# Patient Record
Sex: FEMALE | Race: WHITE | NOT HISPANIC OR LATINO | Employment: OTHER | URBAN - METROPOLITAN AREA
[De-identification: names, ages, dates, MRNs, and addresses within clinical notes are randomized per-mention and may not be internally consistent; named-entity substitution may affect disease eponyms.]

---

## 2023-03-07 LAB
ALANINE AMINOTRANSFERASE (SGPT) (U/L) IN SER/PLAS: 13 U/L (ref 7–45)
ALBUMIN (G/DL) IN SER/PLAS: 4.2 G/DL (ref 3.4–5)
ALKALINE PHOSPHATASE (U/L) IN SER/PLAS: 46 U/L (ref 33–136)
ASPARTATE AMINOTRANSFERASE (SGOT) (U/L) IN SER/PLAS: 20 U/L (ref 9–39)
BASOPHILS (10*3/UL) IN BLOOD BY AUTOMATED COUNT: 0.01 X10E9/L (ref 0–0.1)
BASOPHILS/100 LEUKOCYTES IN BLOOD BY AUTOMATED COUNT: 0.3 % (ref 0–2)
BILIRUBIN DIRECT (MG/DL) IN SER/PLAS: 0 MG/DL (ref 0–0.3)
BILIRUBIN TOTAL (MG/DL) IN SER/PLAS: 0.3 MG/DL (ref 0–1.2)
C REACTIVE PROTEIN (MG/L) IN SER/PLAS: <0.1 MG/DL
CALCIDIOL (25 OH VITAMIN D3) (NG/ML) IN SER/PLAS: 51 NG/ML
CALCIUM (MG/DL) IN SER/PLAS: 11 MG/DL (ref 8.6–10.3)
ERYTHROCYTE DISTRIBUTION WIDTH (RATIO) BY AUTOMATED COUNT: 17.9 % (ref 11.5–14.5)
ERYTHROCYTE MEAN CORPUSCULAR HEMOGLOBIN CONCENTRATION (G/DL) BY AUTOMATED: 29.9 G/DL (ref 32–36)
ERYTHROCYTE MEAN CORPUSCULAR VOLUME (FL) BY AUTOMATED COUNT: 89 FL (ref 80–100)
ERYTHROCYTES (10*6/UL) IN BLOOD BY AUTOMATED COUNT: 4.4 X10E12/L (ref 4–5.2)
GLUCOSE (MG/DL) IN SER/PLAS: 90 MG/DL (ref 74–99)
HEMATOCRIT (%) IN BLOOD BY AUTOMATED COUNT: 39.1 % (ref 36–46)
HEMOGLOBIN (G/DL) IN BLOOD: 11.7 G/DL (ref 12–16)
IMMATURE GRANULOCYTES/100 LEUKOCYTES IN BLOOD BY AUTOMATED COUNT: 0.3 % (ref 0–0.9)
LEUKOCYTES (10*3/UL) IN BLOOD BY AUTOMATED COUNT: 3.2 X10E9/L (ref 4.4–11.3)
LYMPHOCYTES (10*3/UL) IN BLOOD BY AUTOMATED COUNT: 1.33 X10E9/L (ref 1.2–4.8)
LYMPHOCYTES/100 LEUKOCYTES IN BLOOD BY AUTOMATED COUNT: 41.7 % (ref 13–44)
MONOCYTES (10*3/UL) IN BLOOD BY AUTOMATED COUNT: 0.26 X10E9/L (ref 0.1–1)
MONOCYTES/100 LEUKOCYTES IN BLOOD BY AUTOMATED COUNT: 8.2 % (ref 2–10)
NEUTROPHILS (10*3/UL) IN BLOOD BY AUTOMATED COUNT: 1.58 X10E9/L (ref 1.2–7.7)
NEUTROPHILS/100 LEUKOCYTES IN BLOOD BY AUTOMATED COUNT: 49.5 % (ref 40–80)
PLATELETS (10*3/UL) IN BLOOD AUTOMATED COUNT: 175 X10E9/L (ref 150–450)
PROTEIN TOTAL: 6.9 G/DL (ref 6.4–8.2)
URATE (MG/DL) IN SER/PLAS: 4.1 MG/DL (ref 2.3–6.7)
UREA NITROGEN (MG/DL) IN SER/PLAS: 16 MG/DL (ref 6–23)

## 2023-03-08 LAB
PARATHYRIN INTACT (PG/ML) IN SER/PLAS: 152.4 PG/ML (ref 18.5–88)
POC CALCIUM IONIZED (MMOL/L) IN BLOOD: 1.47 MMOL/L (ref 1.1–1.33)

## 2023-03-10 LAB
ALBUMIN ELP: 3.9 G/DL (ref 3.4–5)
ALPHA 1: 0.3 G/DL (ref 0.2–0.6)
ALPHA 2: 0.8 G/DL (ref 0.4–1.1)
BETA: 0.8 G/DL (ref 0.5–1.2)
GAMMA GLOBULIN: 1.1 G/DL (ref 0.5–1.4)
OSTEOCALCIN: 18 NG/ML (ref 8–36)
PATH REVIEW-SERUM PROTEIN ELECTROPHORESIS: NORMAL
PROTEIN ELECTROPHORESIS INTERPRETATION: NORMAL
PROTEIN TOTAL: 6.9 G/DL (ref 6.4–8.2)

## 2023-04-21 DIAGNOSIS — K59.09 CHRONIC CONSTIPATION: ICD-10-CM

## 2023-04-24 PROBLEM — I25.84 CORONARY ARTERY CALCIFICATION: Status: ACTIVE | Noted: 2023-04-24

## 2023-04-24 PROBLEM — E21.3 HYPERPARATHYROIDISM (MULTI): Status: ACTIVE | Noted: 2023-04-24

## 2023-04-24 PROBLEM — F89 DEVELOPMENTAL DISABILITY: Status: ACTIVE | Noted: 2023-04-24

## 2023-04-24 PROBLEM — I10 HTN (HYPERTENSION): Status: ACTIVE | Noted: 2023-04-24

## 2023-04-24 PROBLEM — K44.9 PARAESOPHAGEAL HIATAL HERNIA: Status: ACTIVE | Noted: 2023-04-24

## 2023-04-24 PROBLEM — M81.0 OSTEOPOROSIS: Status: ACTIVE | Noted: 2023-04-24

## 2023-04-24 PROBLEM — K21.9 GERD (GASTROESOPHAGEAL REFLUX DISEASE): Status: ACTIVE | Noted: 2023-04-24

## 2023-04-24 PROBLEM — G47.00 INSOMNIA: Status: ACTIVE | Noted: 2023-04-24

## 2023-04-24 PROBLEM — G40.909 SEIZURE DISORDER (MULTI): Status: ACTIVE | Noted: 2023-04-24

## 2023-04-24 PROBLEM — I25.10 CORONARY ARTERY CALCIFICATION: Status: ACTIVE | Noted: 2023-04-24

## 2023-04-24 PROBLEM — R60.0 PEDAL EDEMA: Status: ACTIVE | Noted: 2023-04-24

## 2023-04-24 PROBLEM — K59.09 CHRONIC CONSTIPATION: Status: ACTIVE | Noted: 2023-04-24

## 2023-04-24 PROBLEM — G80.9 CEREBRAL PALSY (MULTI): Status: ACTIVE | Noted: 2023-04-24

## 2023-04-24 RX ORDER — TRAZODONE HYDROCHLORIDE 100 MG/1
1 TABLET ORAL NIGHTLY
COMMUNITY
Start: 2016-10-31 | End: 2023-05-18 | Stop reason: SDUPTHER

## 2023-04-24 RX ORDER — FOLIC ACID 1 MG/1
1 TABLET ORAL DAILY
COMMUNITY
Start: 2016-09-13 | End: 2023-05-18 | Stop reason: SDUPTHER

## 2023-04-24 RX ORDER — ROMOSOZUMAB-AQQG 105 MG/1.17ML
1 INJECTION, SOLUTION SUBCUTANEOUS
COMMUNITY
End: 2023-08-21 | Stop reason: ALTCHOICE

## 2023-04-24 RX ORDER — MAGNESIUM HYDROXIDE 2400 MG/10ML
10 SUSPENSION ORAL DAILY PRN
COMMUNITY
Start: 2021-01-30

## 2023-04-24 RX ORDER — ASPIRIN 81 MG/1
81 TABLET ORAL DAILY
COMMUNITY
Start: 2021-04-20

## 2023-04-24 RX ORDER — PHENYTOIN SODIUM 100 MG/1
1 CAPSULE, EXTENDED RELEASE ORAL 2 TIMES DAILY
COMMUNITY
Start: 2016-09-13 | End: 2024-02-23

## 2023-04-24 RX ORDER — HYDROCHLOROTHIAZIDE 12.5 MG/1
1 CAPSULE ORAL DAILY
COMMUNITY
Start: 2021-04-20 | End: 2023-05-18 | Stop reason: SDUPTHER

## 2023-04-24 RX ORDER — CARBAMAZEPINE 200 MG/1
TABLET ORAL
COMMUNITY
Start: 2016-09-13 | End: 2023-10-09

## 2023-04-24 RX ORDER — DOCUSATE SODIUM 100 MG/1
1 CAPSULE, LIQUID FILLED ORAL 2 TIMES DAILY
COMMUNITY
Start: 2019-05-06 | End: 2023-08-21 | Stop reason: SDUPTHER

## 2023-04-24 RX ORDER — POLYETHYLENE GLYCOL 3350 17 G/17G
1 POWDER, FOR SOLUTION ORAL DAILY
COMMUNITY
Start: 2021-12-09 | End: 2023-05-18 | Stop reason: SDUPTHER

## 2023-04-24 RX ORDER — CHOLECALCIFEROL (VITAMIN D3) 50 MCG
2 TABLET ORAL DAILY
COMMUNITY
Start: 2021-04-20 | End: 2024-02-20 | Stop reason: SDUPTHER

## 2023-05-01 RX ORDER — DOCUSATE SODIUM 100 MG/1
100 CAPSULE, LIQUID FILLED ORAL 2 TIMES DAILY
Qty: 60 CAPSULE | Refills: 11 | Status: SHIPPED | OUTPATIENT
Start: 2023-05-01 | End: 2023-05-18 | Stop reason: SDUPTHER

## 2023-05-18 DIAGNOSIS — G47.00 INSOMNIA, UNSPECIFIED TYPE: ICD-10-CM

## 2023-05-18 DIAGNOSIS — R60.0 PEDAL EDEMA: ICD-10-CM

## 2023-05-18 DIAGNOSIS — Z00.00 HEALTHCARE MAINTENANCE: ICD-10-CM

## 2023-05-18 DIAGNOSIS — K59.09 CHRONIC CONSTIPATION: ICD-10-CM

## 2023-05-18 RX ORDER — TRAZODONE HYDROCHLORIDE 100 MG/1
100 TABLET ORAL NIGHTLY
Qty: 90 TABLET | Refills: 1 | Status: SHIPPED | OUTPATIENT
Start: 2023-05-18 | End: 2023-08-21 | Stop reason: SDUPTHER

## 2023-05-18 RX ORDER — DOCUSATE SODIUM 100 MG/1
100 CAPSULE, LIQUID FILLED ORAL 2 TIMES DAILY
Qty: 180 CAPSULE | Refills: 1 | Status: SHIPPED | OUTPATIENT
Start: 2023-05-18 | End: 2023-08-21 | Stop reason: SDUPTHER

## 2023-05-18 RX ORDER — HYDROCHLOROTHIAZIDE 12.5 MG/1
12.5 CAPSULE ORAL EVERY MORNING
Qty: 90 CAPSULE | Refills: 1 | Status: SHIPPED | OUTPATIENT
Start: 2023-05-18 | End: 2023-08-21 | Stop reason: SDUPTHER

## 2023-05-18 RX ORDER — FOLIC ACID 1 MG/1
1 TABLET ORAL DAILY
Qty: 90 TABLET | Refills: 1 | Status: SHIPPED | OUTPATIENT
Start: 2023-05-18 | End: 2023-08-21 | Stop reason: SDUPTHER

## 2023-05-18 RX ORDER — POLYETHYLENE GLYCOL 3350 17 G/17G
17 POWDER, FOR SOLUTION ORAL DAILY
Qty: 1530 G | Refills: 1 | Status: SHIPPED | OUTPATIENT
Start: 2023-05-18 | End: 2023-08-21 | Stop reason: SDUPTHER

## 2023-07-19 LAB
BASOPHILS (10*3/UL) IN BLOOD BY AUTOMATED COUNT: 0.01 X10E9/L (ref 0–0.1)
BASOPHILS/100 LEUKOCYTES IN BLOOD BY AUTOMATED COUNT: 0.3 % (ref 0–2)
ERYTHROCYTE DISTRIBUTION WIDTH (RATIO) BY AUTOMATED COUNT: 17.5 % (ref 11.5–14.5)
ERYTHROCYTE MEAN CORPUSCULAR HEMOGLOBIN CONCENTRATION (G/DL) BY AUTOMATED: 29.6 G/DL (ref 32–36)
ERYTHROCYTE MEAN CORPUSCULAR VOLUME (FL) BY AUTOMATED COUNT: 89 FL (ref 80–100)
ERYTHROCYTES (10*6/UL) IN BLOOD BY AUTOMATED COUNT: 4.37 X10E12/L (ref 4–5.2)
HEMATOCRIT (%) IN BLOOD BY AUTOMATED COUNT: 38.9 % (ref 36–46)
HEMOGLOBIN (G/DL) IN BLOOD: 11.5 G/DL (ref 12–16)
IMMATURE GRANULOCYTES/100 LEUKOCYTES IN BLOOD BY AUTOMATED COUNT: 0 % (ref 0–0.9)
LEUKOCYTES (10*3/UL) IN BLOOD BY AUTOMATED COUNT: 3.7 X10E9/L (ref 4.4–11.3)
LYMPHOCYTES (10*3/UL) IN BLOOD BY AUTOMATED COUNT: 1.34 X10E9/L (ref 0.8–3)
LYMPHOCYTES/100 LEUKOCYTES IN BLOOD BY AUTOMATED COUNT: 36 % (ref 13–44)
MONOCYTES (10*3/UL) IN BLOOD BY AUTOMATED COUNT: 0.27 X10E9/L (ref 0.05–0.8)
MONOCYTES/100 LEUKOCYTES IN BLOOD BY AUTOMATED COUNT: 7.3 % (ref 2–10)
NEUTROPHILS (10*3/UL) IN BLOOD BY AUTOMATED COUNT: 2.1 X10E9/L (ref 1.6–5.5)
NEUTROPHILS/100 LEUKOCYTES IN BLOOD BY AUTOMATED COUNT: 56.4 % (ref 40–80)
PHENYTOIN (UG/ML) IN SER/PLAS: 11 UG/ML (ref 10–20)
PLATELETS (10*3/UL) IN BLOOD AUTOMATED COUNT: 194 X10E9/L (ref 150–450)

## 2023-07-20 LAB — CARBAMAZEPINE (UG/ML) IN SER/PLAS: 6.4 UG/ML (ref 4–12)

## 2023-08-15 PROBLEM — R93.89 ABNORMAL CT SCAN: Status: ACTIVE | Noted: 2023-08-15

## 2023-08-15 PROBLEM — E87.6 HYPOKALEMIA: Status: ACTIVE | Noted: 2023-08-15

## 2023-08-15 PROBLEM — R79.89 ABNORMAL CBC: Status: ACTIVE | Noted: 2023-08-15

## 2023-08-21 ENCOUNTER — OFFICE VISIT (OUTPATIENT)
Dept: PRIMARY CARE | Facility: CLINIC | Age: 73
End: 2023-08-21
Payer: MEDICARE

## 2023-08-21 VITALS
TEMPERATURE: 98.3 F | HEART RATE: 82 BPM | DIASTOLIC BLOOD PRESSURE: 91 MMHG | OXYGEN SATURATION: 94 % | SYSTOLIC BLOOD PRESSURE: 128 MMHG

## 2023-08-21 DIAGNOSIS — Z12.31 BREAST CANCER SCREENING BY MAMMOGRAM: ICD-10-CM

## 2023-08-21 DIAGNOSIS — Z00.00 HEALTHCARE MAINTENANCE: ICD-10-CM

## 2023-08-21 DIAGNOSIS — G47.00 INSOMNIA, UNSPECIFIED TYPE: ICD-10-CM

## 2023-08-21 DIAGNOSIS — Z13.820 SCREENING FOR OSTEOPOROSIS: ICD-10-CM

## 2023-08-21 DIAGNOSIS — I10 HYPERTENSION, UNSPECIFIED TYPE: ICD-10-CM

## 2023-08-21 DIAGNOSIS — Z12.4 SCREENING FOR CERVICAL CANCER: ICD-10-CM

## 2023-08-21 DIAGNOSIS — Z00.00 MEDICARE ANNUAL WELLNESS VISIT, SUBSEQUENT: Primary | ICD-10-CM

## 2023-08-21 DIAGNOSIS — K59.09 CHRONIC CONSTIPATION: ICD-10-CM

## 2023-08-21 DIAGNOSIS — Z78.0 MENOPAUSE: ICD-10-CM

## 2023-08-21 DIAGNOSIS — R60.0 PEDAL EDEMA: ICD-10-CM

## 2023-08-21 DIAGNOSIS — Z12.11 SCREENING FOR COLON CANCER: ICD-10-CM

## 2023-08-21 DIAGNOSIS — Z11.59 NEED FOR HEPATITIS C SCREENING TEST: ICD-10-CM

## 2023-08-21 PROCEDURE — 3080F DIAST BP >= 90 MM HG: CPT | Performed by: FAMILY MEDICINE

## 2023-08-21 PROCEDURE — G0439 PPPS, SUBSEQ VISIT: HCPCS | Performed by: FAMILY MEDICINE

## 2023-08-21 PROCEDURE — 1159F MED LIST DOCD IN RCRD: CPT | Performed by: FAMILY MEDICINE

## 2023-08-21 PROCEDURE — 3074F SYST BP LT 130 MM HG: CPT | Performed by: FAMILY MEDICINE

## 2023-08-21 PROCEDURE — 1036F TOBACCO NON-USER: CPT | Performed by: FAMILY MEDICINE

## 2023-08-21 RX ORDER — HYDROCHLOROTHIAZIDE 12.5 MG/1
12.5 CAPSULE ORAL EVERY MORNING
Qty: 90 CAPSULE | Refills: 3 | Status: SHIPPED | OUTPATIENT
Start: 2023-08-21 | End: 2024-02-20 | Stop reason: SDUPTHER

## 2023-08-21 RX ORDER — TRAZODONE HYDROCHLORIDE 100 MG/1
100 TABLET ORAL NIGHTLY
Qty: 90 TABLET | Refills: 3 | Status: SHIPPED | OUTPATIENT
Start: 2023-08-21 | End: 2024-02-20 | Stop reason: SDUPTHER

## 2023-08-21 RX ORDER — DENOSUMAB 60 MG/ML
60 INJECTION SUBCUTANEOUS
COMMUNITY

## 2023-08-21 RX ORDER — DOCUSATE SODIUM 100 MG/1
100 CAPSULE, LIQUID FILLED ORAL 2 TIMES DAILY
Qty: 180 CAPSULE | Refills: 3 | Status: SHIPPED | OUTPATIENT
Start: 2023-08-21 | End: 2024-02-20 | Stop reason: SDUPTHER

## 2023-08-21 RX ORDER — FOLIC ACID 1 MG/1
1 TABLET ORAL DAILY
Qty: 90 TABLET | Refills: 3 | Status: SHIPPED | OUTPATIENT
Start: 2023-08-21 | End: 2024-02-20 | Stop reason: SDUPTHER

## 2023-08-21 RX ORDER — POLYETHYLENE GLYCOL 3350 17 G/17G
17 POWDER, FOR SOLUTION ORAL DAILY
Qty: 1530 G | Refills: 1 | Status: SHIPPED | OUTPATIENT
Start: 2023-08-21 | End: 2024-02-20 | Stop reason: SDUPTHER

## 2023-08-21 ASSESSMENT — ENCOUNTER SYMPTOMS
WHEEZING: 0
FEVER: 0
COUGH: 0
ABDOMINAL PAIN: 0
DIZZINESS: 0
VOMITING: 0
DIFFICULTY URINATING: 0
OCCASIONAL FEELINGS OF UNSTEADINESS: 0
SHORTNESS OF BREATH: 0
LOSS OF SENSATION IN FEET: 0
DEPRESSION: 0
NUMBNESS: 0
BLOOD IN STOOL: 0
UNEXPECTED WEIGHT CHANGE: 0
LIGHT-HEADEDNESS: 0
DIARRHEA: 0
WEAKNESS: 0
DYSURIA: 0
NAUSEA: 0
CHILLS: 0
CONFUSION: 0
TROUBLE SWALLOWING: 0

## 2023-08-21 ASSESSMENT — ACTIVITIES OF DAILY LIVING (ADL)
GROCERY_SHOPPING: TOTAL CARE
DOING_HOUSEWORK: TOTAL CARE
TAKING_MEDICATION: TOTAL CARE
MANAGING_FINANCES: TOTAL CARE

## 2023-08-21 ASSESSMENT — PATIENT HEALTH QUESTIONNAIRE - PHQ9
2. FEELING DOWN, DEPRESSED OR HOPELESS: NOT AT ALL
1. LITTLE INTEREST OR PLEASURE IN DOING THINGS: NOT AT ALL
SUM OF ALL RESPONSES TO PHQ9 QUESTIONS 1 AND 2: 0

## 2023-08-21 NOTE — PATIENT INSTRUCTIONS
Please let me know if Amanda would like to consider taking a statin (e.g., Lipitor, Crestor) for cholesterol and cardiovascular risk reduction, in which case we would recheck her cholesterol.    Please return for a follow-up appointment in 6 months, earlier if any question or concern.    Recommend Shingles vaccine. Shingrix (recombinant zoster, i.e., shingles, vaccine) is a 2-shot vaccination series given at 0 and 2-6 months, indicated for prevention of herpes zoster (shingles) in patients >=50 years of age. The Advisory Committee on Immunization Practices (ACIP) recommends that Shingrix is used for the routine vaccination of immunocompetent patients >=50 years of age, including those who previously received varicella vaccine or zoster vaccine (live) or who report a previous episode of zoster; and patients with chronic medical conditions (e.g., chronic renal failure, diabetes, rheumatoid arthritis, chronic pulmonary disease). Recombinant zoster vaccine is preferred over zoster vaccine (live) in immunocompetent patients (CDC/ACIP [Tenstrike 2018]). Limitations of use: Not indicated for prevention of primary varicella infection (chickenpox) or for the treatment of zoster or postherpetic neuralgia (PHN) (CDC/ACIP [Tenstrike 2018]).     Please return for your next wellness visit in 12 months, earlier if any question or concern.     For assistance with scheduling referrals or consultations, please call [phone]. For laboratory, radiology, and other tests, please call [phone] ([phone] for pediatrics). Please review prescription inserts and published information for possible adverse effects. Return after testing or consultation to review results and recommendations, if symptoms persist, change, worsen, or return, or if you have any question or concern. For non-emergencies, you may call the office. For emergencies, call 9-1-1 or go to the nearest Emergency Department. Please schedule additional appointment(s) to  address concern(s) not addressed today.    In general, results will not be released or discussed over the telephone. Results of tests done through [facility] are released via  [employer]:  [email or web address]    Until we complete our transition to the new system, additional information can be found at [email or web address] or on your Android or iOS (iPhone, iPad) device using the [employer] gabriele available free of charge in your device's gabriele store.

## 2023-08-21 NOTE — PROGRESS NOTES
Subjective   Patient ID: Amanda Pang is a 73 y.o. female who presents for Establish Care (Pt presents as new to you pt to establish care and pt is non ambulatory, speech impaired.BL).  HPI      Review of Systems   Constitutional:  Negative for chills, fever and unexpected weight change.   HENT:  Negative for ear pain and trouble swallowing.    Respiratory:  Negative for cough, shortness of breath and wheezing.    Cardiovascular:  Negative for chest pain.   Gastrointestinal:  Negative for abdominal pain, blood in stool, diarrhea, nausea and vomiting.   Genitourinary:  Negative for difficulty urinating and dysuria.   Skin:  Negative for rash.   Neurological:  Negative for dizziness, syncope, weakness, light-headedness and numbness.   Psychiatric/Behavioral:  Negative for behavioral problems and confusion.          Objective   BP (!) 128/91   Pulse 82   Temp 36.8 °C (98.3 °F)   SpO2 94%     Physical Exam  Vitals and nursing note reviewed.   Constitutional:       General: She is not in acute distress.     Appearance: Normal appearance. She is well-developed.   HENT:      Head: Normocephalic and atraumatic.      Nose: Nose normal.   Eyes:      General: No scleral icterus.     Extraocular Movements: Extraocular movements intact.      Conjunctiva/sclera: Conjunctivae normal.   Neck:      Thyroid: No thyromegaly.      Vascular: No carotid bruit or JVD.   Cardiovascular:      Rate and Rhythm: Normal rate and regular rhythm.      Heart sounds: Normal heart sounds.   Pulmonary:      Effort: Pulmonary effort is normal. No respiratory distress.      Breath sounds: Normal breath sounds.   Abdominal:      General: Bowel sounds are normal. There is no distension.      Palpations: Abdomen is soft. There is no mass.      Tenderness: There is no abdominal tenderness. There is no guarding or rebound.   Musculoskeletal:      Cervical back: Normal range of motion. No tenderness.      Right lower le+ Pitting Edema present.       Left lower le+ Pitting Edema present.   Skin:     General: Skin is warm and dry.      Coloration: Skin is not jaundiced.   Neurological:      Mental Status: She is alert. Mental status is at baseline.      Motor: Abnormal muscle tone (left sidebending in wheelchair, contractures of LUE) present.      Gait: Gait abnormal (wheelchair-bound).      Comments: dysphonia   Psychiatric:         Mood and Affect: Mood normal.         Assessment/Plan   Problem List Items Addressed This Visit       Chronic constipation     Return 6 months.         Relevant Medications    docusate sodium (Colace) 100 mg capsule    polyethylene glycol (Glycolax, Miralax) 17 gram/dose powder    HTN (hypertension)     Well controlled.          Insomnia    Relevant Medications    traZODone (Desyrel) 100 mg tablet    Pedal edema    Relevant Medications    hydroCHLOROthiazide (Microzide) 12.5 mg capsule    Medicare annual wellness visit, subsequent - Primary     73yF with special needs, doing fairly well.         Screening for colon cancer     Declined.         Need for hepatitis C screening test    Relevant Orders    Hepatitis C Antibody    Breast cancer screening by mammogram     Does not tolerate the imaging.         Screening for cervical cancer     No longer indicated.         Menopause    Screening for osteoporosis     Per rheumatology.          Other Visit Diagnoses       Healthcare maintenance        Relevant Medications    folic acid (Folvite) 1 mg tablet

## 2023-08-29 ENCOUNTER — LAB (OUTPATIENT)
Dept: LAB | Facility: LAB | Age: 73
End: 2023-08-29
Payer: MEDICARE

## 2023-08-29 DIAGNOSIS — Z11.59 NEED FOR HEPATITIS C SCREENING TEST: ICD-10-CM

## 2023-08-29 LAB
ALANINE AMINOTRANSFERASE (SGPT) (U/L) IN SER/PLAS: 13 U/L (ref 7–45)
ALBUMIN (G/DL) IN SER/PLAS: 4.3 G/DL (ref 3.4–5)
ALKALINE PHOSPHATASE (U/L) IN SER/PLAS: 51 U/L (ref 33–136)
ASPARTATE AMINOTRANSFERASE (SGOT) (U/L) IN SER/PLAS: 20 U/L (ref 9–39)
BASOPHILS (10*3/UL) IN BLOOD BY AUTOMATED COUNT: 0.01 X10E9/L (ref 0–0.1)
BASOPHILS/100 LEUKOCYTES IN BLOOD BY AUTOMATED COUNT: 0.3 % (ref 0–2)
BILIRUBIN DIRECT (MG/DL) IN SER/PLAS: 0 MG/DL (ref 0–0.3)
BILIRUBIN TOTAL (MG/DL) IN SER/PLAS: 0.2 MG/DL (ref 0–1.2)
C REACTIVE PROTEIN (MG/L) IN SER/PLAS: <0.1 MG/DL
CALCIUM (MG/DL) IN SER/PLAS: 11.5 MG/DL (ref 8.6–10.3)
ERYTHROCYTE DISTRIBUTION WIDTH (RATIO) BY AUTOMATED COUNT: 17.4 % (ref 11.5–14.5)
ERYTHROCYTE MEAN CORPUSCULAR HEMOGLOBIN CONCENTRATION (G/DL) BY AUTOMATED: 30.2 G/DL (ref 32–36)
ERYTHROCYTE MEAN CORPUSCULAR VOLUME (FL) BY AUTOMATED COUNT: 90 FL (ref 80–100)
ERYTHROCYTES (10*6/UL) IN BLOOD BY AUTOMATED COUNT: 4.37 X10E12/L (ref 4–5.2)
HEMATOCRIT (%) IN BLOOD BY AUTOMATED COUNT: 39.4 % (ref 36–46)
HEMOGLOBIN (G/DL) IN BLOOD: 11.9 G/DL (ref 12–16)
IMMATURE GRANULOCYTES/100 LEUKOCYTES IN BLOOD BY AUTOMATED COUNT: 0.3 % (ref 0–0.9)
LEUKOCYTES (10*3/UL) IN BLOOD BY AUTOMATED COUNT: 3.7 X10E9/L (ref 4.4–11.3)
LYMPHOCYTES (10*3/UL) IN BLOOD BY AUTOMATED COUNT: 1.32 X10E9/L (ref 0.8–3)
LYMPHOCYTES/100 LEUKOCYTES IN BLOOD BY AUTOMATED COUNT: 35.6 % (ref 13–44)
MONOCYTES (10*3/UL) IN BLOOD BY AUTOMATED COUNT: 0.27 X10E9/L (ref 0.05–0.8)
MONOCYTES/100 LEUKOCYTES IN BLOOD BY AUTOMATED COUNT: 7.3 % (ref 2–10)
NEUTROPHILS (10*3/UL) IN BLOOD BY AUTOMATED COUNT: 2.1 X10E9/L (ref 1.6–5.5)
NEUTROPHILS/100 LEUKOCYTES IN BLOOD BY AUTOMATED COUNT: 56.5 % (ref 40–80)
PLATELETS (10*3/UL) IN BLOOD AUTOMATED COUNT: 192 X10E9/L (ref 150–450)
PROTEIN TOTAL: 7.8 G/DL (ref 6.4–8.2)
SEDIMENTATION RATE, ERYTHROCYTE: 34 MM/H (ref 0–30)
UREA NITROGEN (MG/DL) IN SER/PLAS: 18 MG/DL (ref 6–23)

## 2023-08-29 PROCEDURE — 36415 COLL VENOUS BLD VENIPUNCTURE: CPT

## 2023-08-29 PROCEDURE — 86803 HEPATITIS C AB TEST: CPT

## 2023-08-30 LAB
CALCIDIOL (25 OH VITAMIN D3) (NG/ML) IN SER/PLAS: 60 NG/ML
HEPATITIS C VIRUS AB PRESENCE IN SERUM: NONREACTIVE
PARATHYRIN INTACT (PG/ML) IN SER/PLAS: 151.6 PG/ML (ref 18.5–88)

## 2023-09-01 LAB — OSTEOCALCIN: 12 NG/ML (ref 8–36)

## 2023-09-09 LAB
MISCELLANEUOUS TEST RESULT: NORMAL
NAME OF SENDOUT TEST: NORMAL

## 2023-10-09 DIAGNOSIS — R56.9 SEIZURE (MULTI): Primary | ICD-10-CM

## 2023-10-09 RX ORDER — CARBAMAZEPINE 200 MG/1
TABLET ORAL
Qty: 150 TABLET | Refills: 11 | Status: SHIPPED | OUTPATIENT
Start: 2023-10-09

## 2023-10-18 ENCOUNTER — APPOINTMENT (OUTPATIENT)
Dept: INFUSION THERAPY | Facility: CLINIC | Age: 73
End: 2023-10-18
Payer: MEDICARE

## 2023-11-07 PROBLEM — Z74.09 IMPAIRED MOBILITY AND ADLS: Status: ACTIVE | Noted: 2023-11-07

## 2023-11-07 PROBLEM — Z53.20 MAMMOGRAM DECLINED: Status: ACTIVE | Noted: 2023-11-07

## 2023-11-07 PROBLEM — Z78.9 IMPAIRED MOBILITY AND ADLS: Status: ACTIVE | Noted: 2023-11-07

## 2023-11-07 RX ORDER — ROMOSOZUMAB-AQQG 105 MG/1.17ML
INJECTION, SOLUTION SUBCUTANEOUS
COMMUNITY
End: 2024-02-20 | Stop reason: ALTCHOICE

## 2023-11-08 ENCOUNTER — INFUSION (OUTPATIENT)
Dept: HEMATOLOGY/ONCOLOGY | Facility: CLINIC | Age: 73
End: 2023-11-08
Payer: MEDICARE

## 2023-11-08 VITALS
DIASTOLIC BLOOD PRESSURE: 77 MMHG | HEART RATE: 74 BPM | BODY MASS INDEX: 19.9 KG/M2 | TEMPERATURE: 97 F | WEIGHT: 94.8 LBS | HEIGHT: 58 IN | SYSTOLIC BLOOD PRESSURE: 138 MMHG | RESPIRATION RATE: 17 BRPM | OXYGEN SATURATION: 99 %

## 2023-11-08 DIAGNOSIS — M81.0 OSTEOPOROSIS, UNSPECIFIED OSTEOPOROSIS TYPE, UNSPECIFIED PATHOLOGICAL FRACTURE PRESENCE: Primary | ICD-10-CM

## 2023-11-08 PROCEDURE — 96372 THER/PROPH/DIAG INJ SC/IM: CPT

## 2023-11-08 PROCEDURE — 2500000004 HC RX 250 GENERAL PHARMACY W/ HCPCS (ALT 636 FOR OP/ED): Mod: JZ,JG | Performed by: INTERNAL MEDICINE

## 2023-11-08 RX ORDER — FAMOTIDINE 10 MG/ML
20 INJECTION INTRAVENOUS ONCE AS NEEDED
Status: DISCONTINUED | OUTPATIENT
Start: 2023-11-08 | End: 2023-11-08 | Stop reason: HOSPADM

## 2023-11-08 RX ORDER — FAMOTIDINE 10 MG/ML
20 INJECTION INTRAVENOUS ONCE AS NEEDED
OUTPATIENT
Start: 2024-05-06

## 2023-11-08 RX ORDER — DIPHENHYDRAMINE HYDROCHLORIDE 50 MG/ML
50 INJECTION INTRAMUSCULAR; INTRAVENOUS AS NEEDED
Status: DISCONTINUED | OUTPATIENT
Start: 2023-11-08 | End: 2023-11-08 | Stop reason: HOSPADM

## 2023-11-08 RX ORDER — EPINEPHRINE 0.3 MG/.3ML
0.3 INJECTION SUBCUTANEOUS EVERY 5 MIN PRN
Status: DISCONTINUED | OUTPATIENT
Start: 2023-11-08 | End: 2023-11-08 | Stop reason: HOSPADM

## 2023-11-08 RX ORDER — ALBUTEROL SULFATE 0.83 MG/ML
3 SOLUTION RESPIRATORY (INHALATION) AS NEEDED
Status: DISCONTINUED | OUTPATIENT
Start: 2023-11-08 | End: 2023-11-08 | Stop reason: HOSPADM

## 2023-11-08 RX ORDER — ALBUTEROL SULFATE 0.83 MG/ML
3 SOLUTION RESPIRATORY (INHALATION) AS NEEDED
OUTPATIENT
Start: 2024-05-06

## 2023-11-08 RX ORDER — EPINEPHRINE 0.3 MG/.3ML
0.3 INJECTION SUBCUTANEOUS EVERY 5 MIN PRN
OUTPATIENT
Start: 2024-05-06

## 2023-11-08 RX ORDER — DIPHENHYDRAMINE HYDROCHLORIDE 50 MG/ML
50 INJECTION INTRAMUSCULAR; INTRAVENOUS AS NEEDED
OUTPATIENT
Start: 2024-05-06

## 2023-11-08 RX ADMIN — DENOSUMAB 60 MG: 60 INJECTION SUBCUTANEOUS at 13:05

## 2023-11-08 ASSESSMENT — PAIN SCALES - GENERAL: PAINLEVEL: 0-NO PAIN

## 2024-01-09 ENCOUNTER — APPOINTMENT (OUTPATIENT)
Dept: PRIMARY CARE | Facility: CLINIC | Age: 74
End: 2024-01-09
Payer: MEDICARE

## 2024-02-20 ENCOUNTER — OFFICE VISIT (OUTPATIENT)
Dept: PRIMARY CARE | Facility: CLINIC | Age: 74
End: 2024-02-20
Payer: MEDICARE

## 2024-02-20 VITALS
BODY MASS INDEX: 19.81 KG/M2 | OXYGEN SATURATION: 96 % | SYSTOLIC BLOOD PRESSURE: 159 MMHG | WEIGHT: 95 LBS | DIASTOLIC BLOOD PRESSURE: 89 MMHG | HEART RATE: 79 BPM

## 2024-02-20 DIAGNOSIS — K59.09 CHRONIC CONSTIPATION: ICD-10-CM

## 2024-02-20 DIAGNOSIS — M81.0 OSTEOPOROSIS, UNSPECIFIED OSTEOPOROSIS TYPE, UNSPECIFIED PATHOLOGICAL FRACTURE PRESENCE: ICD-10-CM

## 2024-02-20 DIAGNOSIS — G80.9 CEREBRAL PALSY, UNSPECIFIED TYPE (MULTI): Primary | ICD-10-CM

## 2024-02-20 DIAGNOSIS — G47.00 INSOMNIA, UNSPECIFIED TYPE: ICD-10-CM

## 2024-02-20 DIAGNOSIS — Z00.00 HEALTHCARE MAINTENANCE: ICD-10-CM

## 2024-02-20 DIAGNOSIS — R60.0 PEDAL EDEMA: ICD-10-CM

## 2024-02-20 PROCEDURE — 3079F DIAST BP 80-89 MM HG: CPT | Performed by: FAMILY MEDICINE

## 2024-02-20 PROCEDURE — 1126F AMNT PAIN NOTED NONE PRSNT: CPT | Performed by: FAMILY MEDICINE

## 2024-02-20 PROCEDURE — 1036F TOBACCO NON-USER: CPT | Performed by: FAMILY MEDICINE

## 2024-02-20 PROCEDURE — 1159F MED LIST DOCD IN RCRD: CPT | Performed by: FAMILY MEDICINE

## 2024-02-20 PROCEDURE — 3077F SYST BP >= 140 MM HG: CPT | Performed by: FAMILY MEDICINE

## 2024-02-20 PROCEDURE — 99214 OFFICE O/P EST MOD 30 MIN: CPT | Performed by: FAMILY MEDICINE

## 2024-02-20 RX ORDER — FOLIC ACID 1 MG/1
1 TABLET ORAL DAILY
Qty: 90 TABLET | Refills: 3 | Status: SHIPPED | OUTPATIENT
Start: 2024-02-20

## 2024-02-20 RX ORDER — TRAZODONE HYDROCHLORIDE 100 MG/1
100 TABLET ORAL NIGHTLY
Qty: 90 TABLET | Refills: 3 | Status: SHIPPED | OUTPATIENT
Start: 2024-02-20

## 2024-02-20 RX ORDER — HYDROCHLOROTHIAZIDE 12.5 MG/1
12.5 CAPSULE ORAL EVERY MORNING
Qty: 90 CAPSULE | Refills: 3 | Status: SHIPPED | OUTPATIENT
Start: 2024-02-20

## 2024-02-20 RX ORDER — DOCUSATE SODIUM 100 MG/1
100 CAPSULE, LIQUID FILLED ORAL 2 TIMES DAILY
Qty: 60 CAPSULE | Refills: 11 | Status: SHIPPED | OUTPATIENT
Start: 2024-02-20

## 2024-02-20 RX ORDER — POLYETHYLENE GLYCOL 3350 17 G/17G
17 POWDER, FOR SOLUTION ORAL DAILY
Qty: 1530 G | Refills: 1 | Status: SHIPPED | OUTPATIENT
Start: 2024-02-20

## 2024-02-20 RX ORDER — CHOLECALCIFEROL (VITAMIN D3) 50 MCG
4000 TABLET ORAL DAILY
Qty: 60 TABLET | Refills: 11 | Status: SHIPPED | OUTPATIENT
Start: 2024-02-20

## 2024-02-20 ASSESSMENT — ENCOUNTER SYMPTOMS
DIFFICULTY URINATING: 0
DYSURIA: 0
SHORTNESS OF BREATH: 0
NUMBNESS: 0
TROUBLE SWALLOWING: 0
WEAKNESS: 0
COUGH: 0
DIARRHEA: 0
NAUSEA: 0
LIGHT-HEADEDNESS: 0
FEVER: 0
BLOOD IN STOOL: 0
UNEXPECTED WEIGHT CHANGE: 0
VOMITING: 0
ABDOMINAL PAIN: 0
CONFUSION: 0
CHILLS: 0
DIZZINESS: 0
WHEEZING: 0

## 2024-02-20 NOTE — PROGRESS NOTES
Subjective   Patient ID: Amanda Pang is a 73 y.o. female who presents for Follow-up (Pt presents with care taker, 6 month check up, rx's needed.BL).  HPI    Generally feeling well.     Not checking BP at home. Reports BP satisfactorily controlled according to cardiology.        Review of Systems   Constitutional:  Negative for chills, fever and unexpected weight change.   HENT:  Negative for ear pain and trouble swallowing.    Respiratory:  Negative for cough, shortness of breath and wheezing.    Cardiovascular:  Negative for chest pain.   Gastrointestinal:  Negative for abdominal pain, blood in stool, diarrhea, nausea and vomiting.   Genitourinary:  Negative for difficulty urinating and dysuria.   Skin:  Negative for rash.   Neurological:  Negative for dizziness, syncope, weakness, light-headedness and numbness.   Psychiatric/Behavioral:  Negative for behavioral problems and confusion.          Objective   /89   Pulse 79   Wt (!) 43.1 kg (95 lb)   SpO2 96%   BMI 19.81 kg/m²     Physical Exam  Vitals and nursing note reviewed.   Constitutional:       General: She is not in acute distress.     Appearance: She is not diaphoretic.   HENT:      Head: Normocephalic and atraumatic.   Eyes:      General: No scleral icterus.     Extraocular Movements: Extraocular movements intact.      Conjunctiva/sclera: Conjunctivae normal.   Cardiovascular:      Rate and Rhythm: Normal rate and regular rhythm.      Heart sounds: Normal heart sounds.   Pulmonary:      Effort: Pulmonary effort is normal. No respiratory distress.      Breath sounds: Normal breath sounds. No wheezing, rhonchi or rales.   Musculoskeletal:      Right lower le+ Pitting Edema present.      Left lower le+ Pitting Edema present.   Skin:     General: Skin is warm and dry.      Coloration: Skin is not jaundiced.   Neurological:      General: No focal deficit present.      Mental Status: She is alert and oriented to person, place, and time.  Mental status is at baseline.   Psychiatric:         Mood and Affect: Mood normal.         Behavior: Behavior normal.         Thought Content: Thought content normal.         Assessment/Plan   Problem List Items Addressed This Visit       Cerebral palsy (CMS/HCC) - Primary     Add gait belt PRN.         Relevant Orders    Miscellaneous DME    Chronic constipation     Well controlled. Continue current bowel regimen.         Relevant Medications    docusate sodium (Colace) 100 mg capsule    polyethylene glycol (Glycolax, Miralax) 17 gram/dose powder    Insomnia    Relevant Medications    traZODone (Desyrel) 100 mg tablet    Osteoporosis    Relevant Medications    cholecalciferol (Vitamin D-3) 50 MCG (2000 UT) tablet    Pedal edema     Stable.         Relevant Medications    hydroCHLOROthiazide (Microzide) 12.5 mg capsule     Other Visit Diagnoses       Healthcare maintenance        Relevant Medications    folic acid (Folvite) 1 mg tablet

## 2024-02-20 NOTE — PATIENT INSTRUCTIONS
Please return for a(n) follow-up appointment and Wellness visit in 6 months, earlier if any question or concern.    Avoid taking Biotin for a week prior to any blood tests, as it can interfere with certain results. Fasting for labs means 12 hours, nothing to eat or drink, except water and medications, unless directed otherwise.    For assistance with scheduling referrals or consultations, please call [phone]. For laboratory, radiology, and other tests, please call [phone] ([phone] for pediatrics). Please review prescription inserts and published information for possible adverse effects of all medications. Return after testing or consultation to review results and recommendations, if symptoms persist, change, worsen, or return, or if you have any question or concern. If you do not get results within 7-10 days, or you have any question or concern, please send a message, call the office ([phone]), or return to the office for a follow-up appointment. For non-emergencies, you may call the office. For emergencies, call 9-1-1 or go to the nearest Emergency Department. Please schedule additional appointment(s) to address concern(s) not addressed today.    In general, results are not released or discussed over the telephone, but at an appointment or via  [employer]. Results of tests done through [facility] are released via  [employer] (see below).  [email or web address]   [employer] support line: [phone]

## 2024-02-23 DIAGNOSIS — G40.909 SEIZURE DISORDER (MULTI): Primary | ICD-10-CM

## 2024-02-23 RX ORDER — PHENYTOIN SODIUM 100 MG/1
100 CAPSULE, EXTENDED RELEASE ORAL 2 TIMES DAILY
Qty: 60 CAPSULE | Refills: 11 | Status: SHIPPED | OUTPATIENT
Start: 2024-02-23

## 2024-02-27 ENCOUNTER — OFFICE VISIT (OUTPATIENT)
Dept: ENDOCRINOLOGY | Facility: CLINIC | Age: 74
End: 2024-02-27
Payer: MEDICARE

## 2024-02-27 VITALS — HEART RATE: 70 BPM | SYSTOLIC BLOOD PRESSURE: 139 MMHG | DIASTOLIC BLOOD PRESSURE: 96 MMHG

## 2024-02-27 DIAGNOSIS — M81.0 OSTEOPOROSIS WITHOUT CURRENT PATHOLOGICAL FRACTURE, UNSPECIFIED OSTEOPOROSIS TYPE: ICD-10-CM

## 2024-02-27 DIAGNOSIS — E21.3 HYPERPARATHYROIDISM (MULTI): Primary | ICD-10-CM

## 2024-02-27 PROCEDURE — 1160F RVW MEDS BY RX/DR IN RCRD: CPT | Performed by: INTERNAL MEDICINE

## 2024-02-27 PROCEDURE — 3080F DIAST BP >= 90 MM HG: CPT | Performed by: INTERNAL MEDICINE

## 2024-02-27 PROCEDURE — 3075F SYST BP GE 130 - 139MM HG: CPT | Performed by: INTERNAL MEDICINE

## 2024-02-27 PROCEDURE — 1159F MED LIST DOCD IN RCRD: CPT | Performed by: INTERNAL MEDICINE

## 2024-02-27 PROCEDURE — 99213 OFFICE O/P EST LOW 20 MIN: CPT | Performed by: INTERNAL MEDICINE

## 2024-02-27 RX ORDER — ALBUTEROL SULFATE 0.83 MG/ML
3 SOLUTION RESPIRATORY (INHALATION) AS NEEDED
Status: CANCELLED | OUTPATIENT
Start: 2024-05-08

## 2024-02-27 RX ORDER — HEPARIN 100 UNIT/ML
500 SYRINGE INTRAVENOUS AS NEEDED
OUTPATIENT
Start: 2024-05-08

## 2024-02-27 RX ORDER — FAMOTIDINE 10 MG/ML
20 INJECTION INTRAVENOUS ONCE AS NEEDED
Status: CANCELLED | OUTPATIENT
Start: 2024-05-08

## 2024-02-27 RX ORDER — EPINEPHRINE 0.3 MG/.3ML
0.3 INJECTION SUBCUTANEOUS EVERY 5 MIN PRN
Status: CANCELLED | OUTPATIENT
Start: 2024-05-08

## 2024-02-27 RX ORDER — HEPARIN SODIUM,PORCINE/PF 10 UNIT/ML
50 SYRINGE (ML) INTRAVENOUS AS NEEDED
OUTPATIENT
Start: 2024-05-08

## 2024-02-27 RX ORDER — DIPHENHYDRAMINE HYDROCHLORIDE 50 MG/ML
50 INJECTION INTRAMUSCULAR; INTRAVENOUS AS NEEDED
Status: CANCELLED | OUTPATIENT
Start: 2024-05-08

## 2024-02-27 NOTE — PROGRESS NOTES
History Of Present Illness  Amanda Pang is a 73 y.o. female with osteoporosis  Hyperparathyroidism    Prolia 60 mg q6 months    Patient not a candidate for parathyroidectomy    Past Medical History  She has a past medical history of Diaphragmatic hernia without obstruction or gangrene (10/22/2021), Epilepsy, unspecified, not intractable, without status epilepticus (CMS/HCC) (02/26/2019), Impacted cerumen, bilateral (09/18/2019), Personal history of pneumonia (recurrent) (04/20/2021), Syncope and collapse (09/18/2019), and Urinary tract infection, site not specified (09/18/2019).    Surgical History  She has a past surgical history that includes Other surgical history (09/18/2019).     Social History  She reports that she has never smoked. She has never used smokeless tobacco. She reports that she does not drink alcohol and does not use drugs.    Family History  Family History   Problem Relation Name Age of Onset    Breast cancer Mother         Medications  Current Outpatient Medications   Medication Instructions    aspirin 81 mg, oral, Daily    carBAMazepine (TEGretol) 200 mg tablet TAKE 2 TABS (400MG) BY MOUTH EVERY MORNING;TAKE 1 TAB BY MOUTH THREE TIMES DAILY    cholecalciferol (VITAMIN D-3) 4,000 Units, oral, Daily    docusate sodium (COLACE) 100 mg, oral, 2 times daily    folic acid (FOLVITE) 1 mg, oral, Daily    hydroCHLOROthiazide (MICROZIDE) 12.5 mg, oral, Every morning    magnesium hydroxide (Milk of Magnesia) 2,400 mg/10 mL suspension suspension 10 mL, oral, Daily PRN    phenytoin ER (DILANTIN) 100 mg, oral, 2 times daily    polyethylene glycol (GLYCOLAX, MIRALAX) 17 g, oral, Daily    Prolia 60 mg, subcutaneous, Every 6 months    traZODone (DESYREL) 100 mg, oral, Nightly       Allergies  Patient has no known allergies.      Last Recorded Vitals  Blood pressure (!) 139/96, pulse 70.    Physical Exam  Constitutional:       General: She is not in acute distress.         Relevant Results     Latest  Reference Range & Units 03/07/23 10:24   GLUCOSE 74 - 99 mg/dL 90   Blood Urea Nitrogen 6 - 23 mg/dL 16   Calcium 8.6 - 10.3 mg/dL 11.0 (H)   Albumin 3.4 - 5.0 g/dL 4.2   Alkaline Phosphatase 33 - 136 U/L 46   ALT 7 - 45 U/L 13   AST 9 - 39 U/L 20   Bilirubin Total 0.0 - 1.2 mg/dL 0.3   Bilirubin, Direct 0.0 - 0.3 mg/dL 0.0   Total Protein 6.4 - 8.2 g/dL  6.4 - 8.2 g/dL 6.9  6.9      Latest Reference Range & Units 03/07/23 10:24   Protein Electrophoresis Comment  NORMAL   Albumin 3.4 - 5.0 g/dL 3.9   Alpha 1 Globulin 0.2 - 0.6 g/dL 0.3   Alpha 2 Globulin 0.4 - 1.1 g/dL 0.8   Gamma 0.5 - 1.4 g/dL 1.1   Beta Globulin 0.5 - 1.2 g/dL 0.8   Path Review - Serum Protein Electrophoresis   A.SCHMIDT   Calcium, Ion 1.10 - 1.33 mmol/L 1.47 (H)         IMPRESSION  OSTEOPOROSIS    PRIMARY HYPERPARATHYROIDISM      RECOMMENDATIONS  Draw labs in late April  Plan Prolia injection in May    Follow up October if no new intervention.

## 2024-02-27 NOTE — PATIENT INSTRUCTIONS
RECOMMENDATIONS  Draw labs in late April  Plan Prolia injection in May    Follow up October if no new intervention.

## 2024-04-23 ENCOUNTER — LAB (OUTPATIENT)
Dept: LAB | Facility: LAB | Age: 74
End: 2024-04-23
Payer: MEDICARE

## 2024-04-23 DIAGNOSIS — E21.3 HYPERPARATHYROIDISM (MULTI): ICD-10-CM

## 2024-04-23 DIAGNOSIS — M81.0 OSTEOPOROSIS WITHOUT CURRENT PATHOLOGICAL FRACTURE, UNSPECIFIED OSTEOPOROSIS TYPE: ICD-10-CM

## 2024-04-23 LAB
25(OH)D3 SERPL-MCNC: 55 NG/ML (ref 30–100)
ALBUMIN SERPL BCP-MCNC: 4.1 G/DL (ref 3.4–5)
ALP SERPL-CCNC: 47 U/L (ref 33–136)
ALT SERPL W P-5'-P-CCNC: 12 U/L (ref 7–45)
ANION GAP SERPL CALC-SCNC: 11 MMOL/L (ref 10–20)
AST SERPL W P-5'-P-CCNC: 19 U/L (ref 9–39)
BILIRUB SERPL-MCNC: 0.3 MG/DL (ref 0–1.2)
BUN SERPL-MCNC: 23 MG/DL (ref 6–23)
CALCIUM SERPL-MCNC: 10.8 MG/DL (ref 8.6–10.3)
CHLORIDE SERPL-SCNC: 104 MMOL/L (ref 98–107)
CO2 SERPL-SCNC: 30 MMOL/L (ref 21–32)
CREAT SERPL-MCNC: 0.58 MG/DL (ref 0.5–1.05)
EGFRCR SERPLBLD CKD-EPI 2021: >90 ML/MIN/1.73M*2
GLUCOSE SERPL-MCNC: 74 MG/DL (ref 74–99)
POTASSIUM SERPL-SCNC: 4.2 MMOL/L (ref 3.5–5.3)
PROT SERPL-MCNC: 7.2 G/DL (ref 6.4–8.2)
PTH-INTACT SERPL-MCNC: 173 PG/ML (ref 18.5–88)
SODIUM SERPL-SCNC: 141 MMOL/L (ref 136–145)

## 2024-04-23 PROCEDURE — 80053 COMPREHEN METABOLIC PANEL: CPT

## 2024-04-23 PROCEDURE — 36415 COLL VENOUS BLD VENIPUNCTURE: CPT

## 2024-04-23 PROCEDURE — 82306 VITAMIN D 25 HYDROXY: CPT

## 2024-04-23 PROCEDURE — 83970 ASSAY OF PARATHORMONE: CPT

## 2024-05-07 ENCOUNTER — APPOINTMENT (OUTPATIENT)
Dept: HEMATOLOGY/ONCOLOGY | Facility: CLINIC | Age: 74
End: 2024-05-07
Payer: MEDICARE

## 2024-05-08 ENCOUNTER — INFUSION (OUTPATIENT)
Dept: HEMATOLOGY/ONCOLOGY | Facility: CLINIC | Age: 74
End: 2024-05-08
Payer: MEDICARE

## 2024-05-08 VITALS
RESPIRATION RATE: 16 BRPM | SYSTOLIC BLOOD PRESSURE: 137 MMHG | BODY MASS INDEX: 19.81 KG/M2 | DIASTOLIC BLOOD PRESSURE: 74 MMHG | OXYGEN SATURATION: 99 % | WEIGHT: 95 LBS | TEMPERATURE: 97 F | HEART RATE: 83 BPM

## 2024-05-08 DIAGNOSIS — M81.0 OSTEOPOROSIS WITHOUT CURRENT PATHOLOGICAL FRACTURE, UNSPECIFIED OSTEOPOROSIS TYPE: ICD-10-CM

## 2024-05-08 PROCEDURE — 96372 THER/PROPH/DIAG INJ SC/IM: CPT

## 2024-05-08 PROCEDURE — 2500000004 HC RX 250 GENERAL PHARMACY W/ HCPCS (ALT 636 FOR OP/ED): Mod: JZ,JG,SE

## 2024-05-08 RX ORDER — ALBUTEROL SULFATE 0.83 MG/ML
3 SOLUTION RESPIRATORY (INHALATION) AS NEEDED
OUTPATIENT
Start: 2024-11-04

## 2024-05-08 RX ORDER — FAMOTIDINE 10 MG/ML
20 INJECTION INTRAVENOUS ONCE AS NEEDED
OUTPATIENT
Start: 2024-11-04

## 2024-05-08 RX ORDER — DIPHENHYDRAMINE HYDROCHLORIDE 50 MG/ML
50 INJECTION INTRAMUSCULAR; INTRAVENOUS AS NEEDED
OUTPATIENT
Start: 2024-11-04

## 2024-05-08 RX ORDER — EPINEPHRINE 0.3 MG/.3ML
0.3 INJECTION SUBCUTANEOUS EVERY 5 MIN PRN
OUTPATIENT
Start: 2024-11-04

## 2024-05-08 RX ADMIN — DENOSUMAB 60 MG: 60 INJECTION SUBCUTANEOUS at 11:59

## 2024-05-08 ASSESSMENT — PAIN SCALES - GENERAL: PAINLEVEL: 0-NO PAIN

## 2024-05-08 NOTE — PROGRESS NOTES
Patient mentally disabled, w/c bound, came with her caregiver Isis, Patient does not have any teeth, no concern for dental education,  patient tolerated Prolia injection well, left via w/c in stable condition.

## 2024-07-03 ENCOUNTER — APPOINTMENT (OUTPATIENT)
Dept: NEUROLOGY | Facility: CLINIC | Age: 74
End: 2024-07-03
Payer: MEDICARE

## 2024-07-15 ENCOUNTER — APPOINTMENT (OUTPATIENT)
Dept: NEUROLOGY | Facility: CLINIC | Age: 74
End: 2024-07-15
Payer: MEDICARE

## 2024-07-15 DIAGNOSIS — R56.9 SEIZURE (MULTI): ICD-10-CM

## 2024-07-15 DIAGNOSIS — G40.909 SEIZURE DISORDER (MULTI): ICD-10-CM

## 2024-07-15 PROCEDURE — 1036F TOBACCO NON-USER: CPT | Performed by: PSYCHIATRY & NEUROLOGY

## 2024-07-15 PROCEDURE — 1159F MED LIST DOCD IN RCRD: CPT | Performed by: PSYCHIATRY & NEUROLOGY

## 2024-07-15 PROCEDURE — 99213 OFFICE O/P EST LOW 20 MIN: CPT | Performed by: PSYCHIATRY & NEUROLOGY

## 2024-07-15 PROCEDURE — 1160F RVW MEDS BY RX/DR IN RCRD: CPT | Performed by: PSYCHIATRY & NEUROLOGY

## 2024-07-15 RX ORDER — CARBAMAZEPINE 200 MG/1
TABLET ORAL
Qty: 150 TABLET | Refills: 11 | Status: SHIPPED | OUTPATIENT
Start: 2024-07-15

## 2024-07-15 RX ORDER — PHENYTOIN SODIUM 100 MG/1
100 CAPSULE, EXTENDED RELEASE ORAL 2 TIMES DAILY
Qty: 60 CAPSULE | Refills: 11 | Status: SHIPPED | OUTPATIENT
Start: 2024-07-15

## 2024-07-15 NOTE — PATIENT INSTRUCTIONS
I am retiring in September.  Please establish care with another  neurologist (call [phone] to schedule an appointment).  Local neurologists I recommend are Dr. Clement Medrano and Dr. Stef Plaza.  Their offices are in [place] or [place] and you can schedule an appointment with them right now at your convenience.  Also, I can recommend Dr. Antonio Gamboa and Dr. Pablo Del Castillo, who will be opening an office in [place] in the coming months - scheduling for them will be available soon by calling the same  scheduling number [phone].   And there are  neurologists in other locations too.  All of your medical records will be in the computer and available to any  physician you choose.  Thanks and best wishes to you  --Dr. Morales

## 2024-07-15 NOTE — PROGRESS NOTES
Chief complaint:    74 year old woman with seizure disorder.  PCP Dr. Potter.  With aidmarika Marinelli.    HPI:    No seizures.  No side effects.  Takes meds regularly.  No new neurological symptoms.       Current medications include:  Phenytoin  mg takes 1-1  Carbamazepine 200 mg takes 2-1-1-1    I reviewed the relevant portions of the patient's chart since the last visit with me on  7/5/23.    Neurologic Exam     Mental Status   Level of consciousness: alert  Chronic static encephalopathy, at baseline.  Largely nonverbal.     Cranial Nerves   Cranial nerves II through XII intact.     Motor Exam   Muscle bulk: normal  Overall muscle tone: normal    Strength   Strength 5/5 except as noted.     Sensory Exam   Light touch normal.     Gait, Coordination, and Reflexes     Coordination   Romberg: negative    Reflexes   Reflexes 2+ except as noted.   Right plantar: normal  Left plantar: normal  Wheelchair bound.      I reviewed the following data on the patient:  Lab work reviewed.    Assessment and Plan:  Stable.  Seizure-free.  Continue current treatment.   The patient was advised I will be retiring in September of 2024.   Suggested patient establish care with another  neurologist.  There are providers that see patients in [facility], and other area locations.  Contact information to schedule was provided to the patient.    PL56air/TC>50%      Kenneth Morales MD

## 2024-08-23 PROBLEM — S91.319A LACERATION OF FOOT: Status: RESOLVED | Noted: 2024-08-23 | Resolved: 2024-08-23

## 2024-08-27 ENCOUNTER — APPOINTMENT (OUTPATIENT)
Dept: PRIMARY CARE | Facility: CLINIC | Age: 74
End: 2024-08-27
Payer: MEDICARE

## 2024-08-27 VITALS
DIASTOLIC BLOOD PRESSURE: 85 MMHG | TEMPERATURE: 98.2 F | HEART RATE: 78 BPM | SYSTOLIC BLOOD PRESSURE: 151 MMHG | OXYGEN SATURATION: 94 %

## 2024-08-27 DIAGNOSIS — Z00.00 HEALTHCARE MAINTENANCE: ICD-10-CM

## 2024-08-27 DIAGNOSIS — K44.9 PARAESOPHAGEAL HIATAL HERNIA: ICD-10-CM

## 2024-08-27 DIAGNOSIS — R60.0 PEDAL EDEMA: ICD-10-CM

## 2024-08-27 DIAGNOSIS — K59.09 CHRONIC CONSTIPATION: ICD-10-CM

## 2024-08-27 DIAGNOSIS — M81.0 OSTEOPOROSIS, UNSPECIFIED OSTEOPOROSIS TYPE, UNSPECIFIED PATHOLOGICAL FRACTURE PRESENCE: ICD-10-CM

## 2024-08-27 DIAGNOSIS — G47.00 INSOMNIA, UNSPECIFIED TYPE: ICD-10-CM

## 2024-08-27 DIAGNOSIS — Z00.00 MEDICARE ANNUAL WELLNESS VISIT, SUBSEQUENT: Primary | ICD-10-CM

## 2024-08-27 PROCEDURE — 3079F DIAST BP 80-89 MM HG: CPT | Performed by: FAMILY MEDICINE

## 2024-08-27 PROCEDURE — 1160F RVW MEDS BY RX/DR IN RCRD: CPT | Performed by: FAMILY MEDICINE

## 2024-08-27 PROCEDURE — 1123F ACP DISCUSS/DSCN MKR DOCD: CPT | Performed by: FAMILY MEDICINE

## 2024-08-27 PROCEDURE — 1159F MED LIST DOCD IN RCRD: CPT | Performed by: FAMILY MEDICINE

## 2024-08-27 PROCEDURE — 1036F TOBACCO NON-USER: CPT | Performed by: FAMILY MEDICINE

## 2024-08-27 PROCEDURE — 1158F ADVNC CARE PLAN TLK DOCD: CPT | Performed by: FAMILY MEDICINE

## 2024-08-27 PROCEDURE — G0439 PPPS, SUBSEQ VISIT: HCPCS | Performed by: FAMILY MEDICINE

## 2024-08-27 PROCEDURE — 1170F FXNL STATUS ASSESSED: CPT | Performed by: FAMILY MEDICINE

## 2024-08-27 PROCEDURE — 3077F SYST BP >= 140 MM HG: CPT | Performed by: FAMILY MEDICINE

## 2024-08-27 PROCEDURE — 99214 OFFICE O/P EST MOD 30 MIN: CPT | Performed by: FAMILY MEDICINE

## 2024-08-27 RX ORDER — DOCUSATE SODIUM 100 MG/1
100 CAPSULE, LIQUID FILLED ORAL 2 TIMES DAILY
Qty: 200 CAPSULE | Refills: 3 | Status: SHIPPED | OUTPATIENT
Start: 2024-08-27

## 2024-08-27 RX ORDER — FOLIC ACID 1 MG/1
1 TABLET ORAL DAILY
Qty: 100 TABLET | Refills: 3 | Status: SHIPPED | OUTPATIENT
Start: 2024-08-27

## 2024-08-27 RX ORDER — HYDROCHLOROTHIAZIDE 12.5 MG/1
12.5 CAPSULE ORAL EVERY MORNING
Qty: 100 CAPSULE | Refills: 3 | Status: SHIPPED | OUTPATIENT
Start: 2024-08-27

## 2024-08-27 RX ORDER — POLYETHYLENE GLYCOL 3350 17 G/17G
17 POWDER, FOR SOLUTION ORAL DAILY
Qty: 1530 G | Refills: 3 | Status: SHIPPED | OUTPATIENT
Start: 2024-08-27

## 2024-08-27 RX ORDER — CHOLECALCIFEROL (VITAMIN D3) 50 MCG
4000 TABLET ORAL DAILY
Qty: 60 TABLET | Refills: 11 | Status: SHIPPED | OUTPATIENT
Start: 2024-08-27

## 2024-08-27 RX ORDER — TRAZODONE HYDROCHLORIDE 100 MG/1
100 TABLET ORAL NIGHTLY
Qty: 100 TABLET | Refills: 3 | Status: SHIPPED | OUTPATIENT
Start: 2024-08-27

## 2024-08-27 ASSESSMENT — ACTIVITIES OF DAILY LIVING (ADL)
DOING_HOUSEWORK: TOTAL CARE
MANAGING_FINANCES: TOTAL CARE
GROCERY_SHOPPING: TOTAL CARE
BATHING: DEPENDENT
DRESSING: DEPENDENT
TAKING_MEDICATION: TOTAL CARE

## 2024-08-27 ASSESSMENT — ENCOUNTER SYMPTOMS
OCCASIONAL FEELINGS OF UNSTEADINESS: 1
DEPRESSION: 0
NECK PAIN: 0
FEVER: 0
BLOOD IN STOOL: 0
ABDOMINAL DISTENTION: 0
CONSTIPATION: 0
FLANK PAIN: 0
NAUSEA: 0
ABDOMINAL PAIN: 0
DIAPHORESIS: 0
VOMITING: 0
LOSS OF SENSATION IN FEET: 1
CHILLS: 0
BACK PAIN: 0
DIARRHEA: 0

## 2024-08-27 ASSESSMENT — PATIENT HEALTH QUESTIONNAIRE - PHQ9
1. LITTLE INTEREST OR PLEASURE IN DOING THINGS: NOT AT ALL
SUM OF ALL RESPONSES TO PHQ9 QUESTIONS 1 AND 2: 0
2. FEELING DOWN, DEPRESSED OR HOPELESS: NOT AT ALL

## 2024-08-27 NOTE — ASSESSMENT & PLAN NOTE
Per endocrinology.  Orders:    cholecalciferol (Vitamin D-3) 50 MCG (2000 UT) tablet; Take 2 tablets (4,000 Units) by mouth once daily.

## 2024-08-27 NOTE — ASSESSMENT & PLAN NOTE
Doing well.  Orders:    traZODone (Desyrel) 100 mg tablet; Take 1 tablet (100 mg) by mouth once daily at bedtime.

## 2024-08-27 NOTE — PROGRESS NOTES
"Subjective   Reason for Visit: Amanda Pang is an 74 y.o. female developmental disability, confined to wheelchair, constipation, here for Medicare Annual Wellness Visit Subsequent (Pt presents for MCV with care taker, discuss hiatal hernia and BM's, rx's needed. bl)     Past Medical, Surgical, and Family History reviewed and updated in chart.    Reviewed all medications by prescribing practitioner or clinical pharmacist (such as prescriptions, OTCs, herbal therapies and supplements) and documented in the medical record.    HPI  Historian(s): Self and Facility Staff    Generally feeling well.     Declines mammogram, colon cancer screening.    Reports DEXA per endocrinology.    Report decreased oral intake.    Has been doing better with bowel movements taking Colace and Miralax. Recently stools have been smears and small amount, does not appear to be aware of urge to defecate, \"just coming out.\" Decreased appetite recently, normally had pretty good appetite. But today had a large soft bowel movement. Had been a bit lethargic, now back to baseline. Reports multiple surgeons have advised she is not a surgical candidate for repair of hiatal hernia.    Reports hasn't been weighed in years, caregiver doesn't feel she has had any significant weight loss.    Patient Care Team:  Alton Potter DO as PCP - General (Family Medicine)  Alton Potter DO as PCP - [facility]ributed Provider  Kenneth Morales MD (Neurology)  Hakeem Vázquez MD as Primary Care Provider  Negrito Yates MD as Surgeon (Surgical Oncology)  Tuan Reyes MD as Consulting Physician (Endocrinology)  Hakeem Vázquez MD as Referring Physician (Rheumatology)  Demetrio Zimmer DPM as Referring Physician (Podiatry)  Yo Daniels MD as Consulting Physician (Cardiology)  Dereje Hernandez OD (Optometry)     Review of Systems   Constitutional:  Negative for chills, diaphoresis and fever.   Gastrointestinal:  Negative for abdominal " distention, abdominal pain, blood in stool, constipation, diarrhea, nausea and vomiting.   Genitourinary:  Negative for flank pain.   Musculoskeletal:  Negative for back pain and neck pain.       Objective   Vitals:  /85   Pulse 78   Temp 36.8 °C (98.2 °F)   SpO2 94%             Physical Exam  Vitals and nursing note reviewed.   Constitutional:       General: She is not in acute distress.     Appearance: She is not ill-appearing, toxic-appearing or diaphoretic.      Comments: Wheelchair.   HENT:      Head: Normocephalic and atraumatic.   Eyes:      General: No scleral icterus.     Extraocular Movements: Extraocular movements intact.      Conjunctiva/sclera: Conjunctivae normal.   Cardiovascular:      Rate and Rhythm: Normal rate and regular rhythm.      Heart sounds: Normal heart sounds.   Pulmonary:      Effort: Pulmonary effort is normal. No respiratory distress.      Breath sounds: Normal breath sounds. No wheezing, rhonchi or rales.   Abdominal:      General: Bowel sounds are normal. There is no distension.      Palpations: Abdomen is soft. There is no mass.      Tenderness: There is no abdominal tenderness. There is no guarding or rebound.   Musculoskeletal:      Cervical back: Decreased range of motion.      Right lower le+ Pitting Edema present.      Left lower le+ Pitting Edema present.   Skin:     General: Skin is warm and dry.      Coloration: Skin is not jaundiced.   Neurological:      General: No focal deficit present.      Mental Status: She is alert. Mental status is at baseline.      Cranial Nerves: Dysarthria present.      Motor: Weakness and atrophy present.   Psychiatric:         Mood and Affect: Mood normal.         Behavior: Behavior normal.         Thought Content: Thought content normal.         Assessment & Plan  Medicare annual wellness visit, subsequent  74yF confined to wheechair, recent episode of decreased stool output, now doing fairly well.       Healthcare  maintenance    Orders:    folic acid (Folvite) 1 mg tablet; Take 1 tablet (1 mg) by mouth once daily.    Chronic constipation  Continue Docusate, Miralax.  Orders:    docusate sodium (Colace) 100 mg capsule; Take 1 capsule (100 mg) by mouth 2 times a day.    polyethylene glycol (Glycolax, Miralax) 17 gram/dose powder; Take 17 g by mouth once daily.    Insomnia, unspecified type  Doing well.  Orders:    traZODone (Desyrel) 100 mg tablet; Take 1 tablet (100 mg) by mouth once daily at bedtime.    Pedal edema  Stable.  Orders:    hydroCHLOROthiazide (Microzide) 12.5 mg capsule; Take 1 capsule (12.5 mg) by mouth once daily in the morning.    Osteoporosis, unspecified osteoporosis type, unspecified pathological fracture presence  Per endocrinology.  Orders:    cholecalciferol (Vitamin D-3) 50 MCG (2000 UT) tablet; Take 2 tablets (4,000 Units) by mouth once daily.    Paraesophageal hiatal hernia  Presently appears to be doing well. Return PRN. Consider follow-up with surgeon.

## 2024-08-27 NOTE — ASSESSMENT & PLAN NOTE
Stable.  Orders:    hydroCHLOROthiazide (Microzide) 12.5 mg capsule; Take 1 capsule (12.5 mg) by mouth once daily in the morning.

## 2024-08-27 NOTE — ASSESSMENT & PLAN NOTE
Continue Docusate, Miralax.  Orders:    docusate sodium (Colace) 100 mg capsule; Take 1 capsule (100 mg) by mouth 2 times a day.    polyethylene glycol (Glycolax, Miralax) 17 gram/dose powder; Take 17 g by mouth once daily.

## 2024-08-27 NOTE — PATIENT INSTRUCTIONS
Please seek immediate medical attention if symptoms return, or severe pain, inability to pass gas or have a bowel movement, unable to hold down fluids or food, lethargy or other significant change in consciousness or behavior. Consider follow-up with a surgeon regarding recent bowel symptoms (possibility it was a bowel obstruction that has spontaneously resolved, and there would then be a risk of recurrence).    General Surgery  Dr. Juan Carlos Lai, Adelaide Contreras PA-C, Dr. Chapo Onofre, Dr. Dara Torres, Richelle Nuno CNP, Dr. Eric Chua ([place]) [phone]  Dr. Kendal Isaac ([place]) [phone]  Dr. Negrito Yates, Dr. Sonido Wiseman, Dr. Jovita Moralez, [facility] ([place]) [phone]     Please return for a(n) blood pressure, constipation, insomnia, and medication follow-up appointment in 6 months, earlier if any question or concern. Please return for your next Wellness visit in 12 months. Please schedule additional problem-focused appointment(s) to address additional problem(s).    Recommended vaccines:  Influenza, annual  Respiratory Syncytial Virus (RSV)  Shingrix (shingles) vaccine series  Avoid taking Biotin (a vitamin, shows up particularly in hair/nail supplements) for a week prior to any blood tests, as it can interfere with certain results. Fasting for labs means 12 hours, nothing to eat or drink, except water and medications, unless directed otherwise.    For assistance with scheduling referrals or consultations, please call [phone]. For laboratory, radiology, and other tests, please call [phone] ([phone] for pediatrics). Please review prescription inserts and published information for possible adverse effects of all medications. Return after testing or consultation to review results and recommendations, if symptoms persist, change, worsen, or return, or if you have any question or concern. If you do not get results within 7-10 days, or you have any question or concern, please send  a message, call the office ([phone]), or return to the office for a follow-up appointment. For non-emergencies, you may call the office. For emergencies, call 9-1-1 or go to the nearest Emergency Department. Please schedule additional appointment(s) to address concern(s) not addressed today.    In general, results are not released or discussed over the telephone, but at an appointment or via  [employer]. Results of tests done through [facility] are released via  [employer] (see below).  [email or web address]   [employer] support line: [phone]

## 2024-10-15 ENCOUNTER — APPOINTMENT (OUTPATIENT)
Dept: ENDOCRINOLOGY | Facility: CLINIC | Age: 74
End: 2024-10-15
Payer: MEDICARE

## 2024-10-15 VITALS
HEART RATE: 76 BPM | WEIGHT: 95 LBS | BODY MASS INDEX: 19.81 KG/M2 | DIASTOLIC BLOOD PRESSURE: 84 MMHG | SYSTOLIC BLOOD PRESSURE: 148 MMHG

## 2024-10-15 DIAGNOSIS — E21.3 HYPERPARATHYROIDISM (MULTI): Primary | ICD-10-CM

## 2024-10-15 DIAGNOSIS — M81.0 OSTEOPOROSIS WITHOUT CURRENT PATHOLOGICAL FRACTURE, UNSPECIFIED OSTEOPOROSIS TYPE: ICD-10-CM

## 2024-10-15 PROCEDURE — 1160F RVW MEDS BY RX/DR IN RCRD: CPT | Performed by: INTERNAL MEDICINE

## 2024-10-15 PROCEDURE — 99213 OFFICE O/P EST LOW 20 MIN: CPT | Performed by: INTERNAL MEDICINE

## 2024-10-15 PROCEDURE — 1159F MED LIST DOCD IN RCRD: CPT | Performed by: INTERNAL MEDICINE

## 2024-10-15 PROCEDURE — 1036F TOBACCO NON-USER: CPT | Performed by: INTERNAL MEDICINE

## 2024-10-15 PROCEDURE — 3077F SYST BP >= 140 MM HG: CPT | Performed by: INTERNAL MEDICINE

## 2024-10-15 PROCEDURE — 3079F DIAST BP 80-89 MM HG: CPT | Performed by: INTERNAL MEDICINE

## 2024-10-15 ASSESSMENT — ENCOUNTER SYMPTOMS
ABDOMINAL PAIN: 0
FEVER: 0
TROUBLE SWALLOWING: 0
DIARRHEA: 0
FATIGUE: 0
NECK PAIN: 0
VOMITING: 0
SHORTNESS OF BREATH: 0
CONSTIPATION: 0
NAUSEA: 0
PALPITATIONS: 0
HEADACHES: 0
NERVOUS/ANXIOUS: 0
WEAKNESS: 0
TREMORS: 0
UNEXPECTED WEIGHT CHANGE: 0

## 2024-10-15 NOTE — PATIENT INSTRUCTIONS
RECOMMENDATIONS  Continue Prolia injection every 6 months.  Scheduled 11/13/24.  Draw blood around 11/4/24    Follow up 6 months.

## 2024-10-15 NOTE — PROGRESS NOTES
History Of Present Illness  Amanda Pang is a 74 y.o. female with osteoporosis  Hyperparathyroidism     Prolia 60 mg q6 months     Patient not a candidate for parathyroidectomy    No interval history of fracture    Past Medical History  She has a past medical history of Diaphragmatic hernia without obstruction or gangrene (10/22/2021), Epilepsy, unspecified, not intractable, without status epilepticus (02/26/2019), Impacted cerumen, bilateral (09/18/2019), Laceration of foot (08/23/2024), Personal history of pneumonia (recurrent) (04/20/2021), Syncope and collapse (09/18/2019), and Urinary tract infection, site not specified (09/18/2019).    Surgical History  She has a past surgical history that includes Other surgical history (09/18/2019).     Social History  She reports that she has never smoked. She has never used smokeless tobacco. She reports that she does not drink alcohol and does not use drugs.    Family History  Family History   Problem Relation Name Age of Onset    Breast cancer Mother         Medications  Current Outpatient Medications   Medication Instructions    aspirin 81 mg, oral, Daily    carBAMazepine (TEGretol) 200 mg tablet TAKE 2 TABS (400MG) BY MOUTH EVERY MORNING;TAKE 1 TAB BY MOUTH THREE TIMES DAILY    cholecalciferol (VITAMIN D-3) 4,000 Units, oral, Daily    docusate sodium (COLACE) 100 mg, oral, 2 times daily    folic acid (FOLVITE) 1 mg, oral, Daily    hydroCHLOROthiazide (MICROZIDE) 12.5 mg, oral, Every morning    magnesium hydroxide (Milk of Magnesia) 2,400 mg/10 mL suspension suspension 10 mL, oral, Daily PRN    phenytoin ER (DILANTIN) 100 mg, oral, 2 times daily    polyethylene glycol (GLYCOLAX, MIRALAX) 17 g, oral, Daily    Prolia 60 mg, subcutaneous, Every 6 months    traZODone (DESYREL) 100 mg, oral, Nightly       Allergies  Patient has no known allergies.    Review of Systems   Constitutional:  Negative for fatigue, fever and unexpected weight change.   HENT:  Negative for  trouble swallowing.    Eyes:  Negative for visual disturbance.   Respiratory:  Negative for shortness of breath.    Cardiovascular:  Negative for chest pain and palpitations.   Gastrointestinal:  Negative for abdominal pain, constipation, diarrhea, nausea and vomiting.   Endocrine: Negative for cold intolerance and heat intolerance.   Musculoskeletal:  Negative for neck pain.   Skin:  Negative for rash.   Neurological:  Negative for tremors, weakness and headaches.   Psychiatric/Behavioral:  The patient is not nervous/anxious.          Last Recorded Vitals  Blood pressure 148/84, pulse 76, weight (!) 43.1 kg (95 lb).    Physical Exam  Constitutional:       General: She is not in acute distress.     Comments: Examined in wheelchair   Cardiovascular:      Comments: Ankle edema         Relevant Results   Latest Reference Range & Units 04/23/24 11:13   GLUCOSE 74 - 99 mg/dL 74   SODIUM 136 - 145 mmol/L 141   POTASSIUM 3.5 - 5.3 mmol/L 4.2   CHLORIDE 98 - 107 mmol/L 104   Bicarbonate 21 - 32 mmol/L 30   Anion Gap 10 - 20 mmol/L 11   Blood Urea Nitrogen 6 - 23 mg/dL 23   Creatinine 0.50 - 1.05 mg/dL 0.58   EGFR >60 mL/min/1.73m*2 >90   Calcium 8.6 - 10.3 mg/dL 10.8 (H)   Albumin 3.4 - 5.0 g/dL 4.1   Alkaline Phosphatase 33 - 136 U/L 47   ALT 7 - 45 U/L 12   AST 9 - 39 U/L 19   Bilirubin Total 0.0 - 1.2 mg/dL 0.3      Latest Reference Range & Units 04/23/24 11:13   Parathyroid Hormone, Intact 18.5 - 88.0 pg/mL 173.0 (H)       IMPRESSION  OSTEOPOROSIS  No interval fracture    PRIMARY HYPERPARATHYROIDISM      RECOMMENDATIONS  Continue Prolia injection every 6 months.  Scheduled 11/13/24.  Draw blood around 11/4/24    Follow up 6 months.   Draw blood before next injection

## 2024-10-15 NOTE — LETTER
October 15, 2024     Alton Potter DO  [address]  [address]  [facility] [address]    Patient: Amanda Pang   YOB: 1950   Date of Visit: 10/15/2024       Dear Dr. Alotn Potter DO:    Thank you for referring Amanda Pang to me for evaluation. Below are my notes for this consultation.  If you have questions, please do not hesitate to call me. I look forward to following your patient along with you.       Sincerely,     Tuan Reyes MD      CC: No Recipients  ______________________________________________________________________________________    History Of Present Illness  Amanda Pang is a 74 y.o. female with osteoporosis  Hyperparathyroidism     Prolia 60 mg q6 months     Patient not a candidate for parathyroidectomy    No interval history of fracture    Past Medical History  She has a past medical history of Diaphragmatic hernia without obstruction or gangrene (10/22/2021), Epilepsy, unspecified, not intractable, without status epilepticus (02/26/2019), Impacted cerumen, bilateral (09/18/2019), Laceration of foot (08/23/2024), Personal history of pneumonia (recurrent) (04/20/2021), Syncope and collapse (09/18/2019), and Urinary tract infection, site not specified (09/18/2019).    Surgical History  She has a past surgical history that includes Other surgical history (09/18/2019).     Social History  She reports that she has never smoked. She has never used smokeless tobacco. She reports that she does not drink alcohol and does not use drugs.    Family History  Family History   Problem Relation Name Age of Onset   • Breast cancer Mother         Medications  Current Outpatient Medications   Medication Instructions   • aspirin 81 mg, oral, Daily   • carBAMazepine (TEGretol) 200 mg tablet TAKE 2 TABS (400MG) BY MOUTH EVERY MORNING;TAKE 1 TAB BY MOUTH THREE TIMES DAILY   • cholecalciferol (VITAMIN D-3) 4,000 Units, oral, Daily   • docusate sodium (COLACE) 100 mg, oral, 2 times daily    • folic acid (FOLVITE) 1 mg, oral, Daily   • hydroCHLOROthiazide (MICROZIDE) 12.5 mg, oral, Every morning   • magnesium hydroxide (Milk of Magnesia) 2,400 mg/10 mL suspension suspension 10 mL, oral, Daily PRN   • phenytoin ER (DILANTIN) 100 mg, oral, 2 times daily   • polyethylene glycol (GLYCOLAX, MIRALAX) 17 g, oral, Daily   • Prolia 60 mg, subcutaneous, Every 6 months   • traZODone (DESYREL) 100 mg, oral, Nightly       Allergies  Patient has no known allergies.    Review of Systems   Constitutional:  Negative for fatigue, fever and unexpected weight change.   HENT:  Negative for trouble swallowing.    Eyes:  Negative for visual disturbance.   Respiratory:  Negative for shortness of breath.    Cardiovascular:  Negative for chest pain and palpitations.   Gastrointestinal:  Negative for abdominal pain, constipation, diarrhea, nausea and vomiting.   Endocrine: Negative for cold intolerance and heat intolerance.   Musculoskeletal:  Negative for neck pain.   Skin:  Negative for rash.   Neurological:  Negative for tremors, weakness and headaches.   Psychiatric/Behavioral:  The patient is not nervous/anxious.          Last Recorded Vitals  Blood pressure 148/84, pulse 76, weight (!) 43.1 kg (95 lb).    Physical Exam  Constitutional:       General: She is not in acute distress.     Comments: Examined in wheelchair   Cardiovascular:      Comments: Ankle edema         Relevant Results   Latest Reference Range & Units 04/23/24 11:13   GLUCOSE 74 - 99 mg/dL 74   SODIUM 136 - 145 mmol/L 141   POTASSIUM 3.5 - 5.3 mmol/L 4.2   CHLORIDE 98 - 107 mmol/L 104   Bicarbonate 21 - 32 mmol/L 30   Anion Gap 10 - 20 mmol/L 11   Blood Urea Nitrogen 6 - 23 mg/dL 23   Creatinine 0.50 - 1.05 mg/dL 0.58   EGFR >60 mL/min/1.73m*2 >90   Calcium 8.6 - 10.3 mg/dL 10.8 (H)   Albumin 3.4 - 5.0 g/dL 4.1   Alkaline Phosphatase 33 - 136 U/L 47   ALT 7 - 45 U/L 12   AST 9 - 39 U/L 19   Bilirubin Total 0.0 - 1.2 mg/dL 0.3      Latest Reference Range &  Units 04/23/24 11:13   Parathyroid Hormone, Intact 18.5 - 88.0 pg/mL 173.0 (H)       IMPRESSION  OSTEOPOROSIS  No interval fracture    PRIMARY HYPERPARATHYROIDISM      RECOMMENDATIONS  Continue Prolia injection every 6 months.  Scheduled 11/13/24.  Draw blood around 11/4/24    Follow up 6 months.   Draw blood before next injection

## 2024-11-05 ENCOUNTER — LAB (OUTPATIENT)
Dept: LAB | Facility: LAB | Age: 74
End: 2024-11-05
Payer: MEDICARE

## 2024-11-05 DIAGNOSIS — M81.0 OSTEOPOROSIS WITHOUT CURRENT PATHOLOGICAL FRACTURE, UNSPECIFIED OSTEOPOROSIS TYPE: ICD-10-CM

## 2024-11-05 LAB
ALBUMIN SERPL BCP-MCNC: 4.3 G/DL (ref 3.4–5)
ALP SERPL-CCNC: 57 U/L (ref 33–136)
ALT SERPL W P-5'-P-CCNC: 12 U/L (ref 7–45)
ANION GAP SERPL CALC-SCNC: 12 MMOL/L (ref 10–20)
AST SERPL W P-5'-P-CCNC: 20 U/L (ref 9–39)
BILIRUB SERPL-MCNC: 0.3 MG/DL (ref 0–1.2)
BUN SERPL-MCNC: 18 MG/DL (ref 6–23)
CALCIUM SERPL-MCNC: 11.4 MG/DL (ref 8.6–10.3)
CHLORIDE SERPL-SCNC: 102 MMOL/L (ref 98–107)
CO2 SERPL-SCNC: 32 MMOL/L (ref 21–32)
CREAT SERPL-MCNC: 0.57 MG/DL (ref 0.5–1.05)
EGFRCR SERPLBLD CKD-EPI 2021: >90 ML/MIN/1.73M*2
GLUCOSE SERPL-MCNC: 102 MG/DL (ref 74–99)
POTASSIUM SERPL-SCNC: 4.1 MMOL/L (ref 3.5–5.3)
PROT SERPL-MCNC: 7.6 G/DL (ref 6.4–8.2)
SODIUM SERPL-SCNC: 142 MMOL/L (ref 136–145)

## 2024-11-05 PROCEDURE — 80053 COMPREHEN METABOLIC PANEL: CPT

## 2024-11-05 PROCEDURE — 36415 COLL VENOUS BLD VENIPUNCTURE: CPT

## 2024-11-13 ENCOUNTER — INFUSION (OUTPATIENT)
Dept: HEMATOLOGY/ONCOLOGY | Facility: CLINIC | Age: 74
End: 2024-11-13
Payer: MEDICARE

## 2024-11-13 VITALS
DIASTOLIC BLOOD PRESSURE: 74 MMHG | WEIGHT: 95 LBS | OXYGEN SATURATION: 96 % | SYSTOLIC BLOOD PRESSURE: 174 MMHG | HEART RATE: 88 BPM | RESPIRATION RATE: 16 BRPM | BODY MASS INDEX: 19.81 KG/M2 | TEMPERATURE: 96.8 F

## 2024-11-13 DIAGNOSIS — M81.0 OSTEOPOROSIS WITHOUT CURRENT PATHOLOGICAL FRACTURE, UNSPECIFIED OSTEOPOROSIS TYPE: ICD-10-CM

## 2024-11-13 PROCEDURE — 96372 THER/PROPH/DIAG INJ SC/IM: CPT

## 2024-11-13 PROCEDURE — 2500000004 HC RX 250 GENERAL PHARMACY W/ HCPCS (ALT 636 FOR OP/ED): Mod: JG | Performed by: INTERNAL MEDICINE

## 2024-11-13 RX ORDER — FAMOTIDINE 10 MG/ML
20 INJECTION INTRAVENOUS ONCE AS NEEDED
OUTPATIENT
Start: 2025-05-03

## 2024-11-13 RX ORDER — ALBUTEROL SULFATE 0.83 MG/ML
3 SOLUTION RESPIRATORY (INHALATION) AS NEEDED
OUTPATIENT
Start: 2025-05-03

## 2024-11-13 RX ORDER — DIPHENHYDRAMINE HYDROCHLORIDE 50 MG/ML
50 INJECTION INTRAMUSCULAR; INTRAVENOUS AS NEEDED
OUTPATIENT
Start: 2025-05-03

## 2024-11-13 RX ORDER — EPINEPHRINE 0.3 MG/.3ML
0.3 INJECTION SUBCUTANEOUS EVERY 5 MIN PRN
OUTPATIENT
Start: 2025-05-03

## 2024-11-13 ASSESSMENT — PAIN SCALES - GENERAL: PAINLEVEL_OUTOF10: 0-NO PAIN

## 2025-02-07 DIAGNOSIS — M81.0 OSTEOPOROSIS WITHOUT CURRENT PATHOLOGICAL FRACTURE, UNSPECIFIED OSTEOPOROSIS TYPE: Primary | ICD-10-CM

## 2025-03-04 ENCOUNTER — APPOINTMENT (OUTPATIENT)
Dept: PRIMARY CARE | Facility: CLINIC | Age: 75
End: 2025-03-04
Payer: MEDICARE

## 2025-03-04 VITALS
HEART RATE: 84 BPM | OXYGEN SATURATION: 99 % | TEMPERATURE: 99.3 F | DIASTOLIC BLOOD PRESSURE: 89 MMHG | SYSTOLIC BLOOD PRESSURE: 147 MMHG

## 2025-03-04 DIAGNOSIS — J40 BRONCHITIS: ICD-10-CM

## 2025-03-04 DIAGNOSIS — G47.00 INSOMNIA, UNSPECIFIED TYPE: Primary | ICD-10-CM

## 2025-03-04 DIAGNOSIS — G80.9 CEREBRAL PALSY, UNSPECIFIED TYPE (MULTI): ICD-10-CM

## 2025-03-04 DIAGNOSIS — R56.9 SEIZURE (MULTI): ICD-10-CM

## 2025-03-04 DIAGNOSIS — K59.09 CHRONIC CONSTIPATION: ICD-10-CM

## 2025-03-04 PROCEDURE — 1036F TOBACCO NON-USER: CPT | Performed by: FAMILY MEDICINE

## 2025-03-04 PROCEDURE — 3079F DIAST BP 80-89 MM HG: CPT | Performed by: FAMILY MEDICINE

## 2025-03-04 PROCEDURE — G2211 COMPLEX E/M VISIT ADD ON: HCPCS | Performed by: FAMILY MEDICINE

## 2025-03-04 PROCEDURE — 1159F MED LIST DOCD IN RCRD: CPT | Performed by: FAMILY MEDICINE

## 2025-03-04 PROCEDURE — 99214 OFFICE O/P EST MOD 30 MIN: CPT | Performed by: FAMILY MEDICINE

## 2025-03-04 PROCEDURE — 3077F SYST BP >= 140 MM HG: CPT | Performed by: FAMILY MEDICINE

## 2025-03-04 RX ORDER — METHYLPREDNISOLONE 4 MG/1
TABLET ORAL
Qty: 21 TABLET | Refills: 0 | Status: SHIPPED | OUTPATIENT
Start: 2025-03-04

## 2025-03-04 RX ORDER — AZITHROMYCIN 250 MG/1
TABLET, FILM COATED ORAL
Qty: 6 TABLET | Refills: 0 | Status: SHIPPED | OUTPATIENT
Start: 2025-03-04 | End: 2025-03-09

## 2025-03-04 ASSESSMENT — ENCOUNTER SYMPTOMS: COUGH: 1

## 2025-03-04 NOTE — PROGRESS NOTES
Subjective   Patient ID: Amanda Pang is a 74 y.o. female, h/o developmental disability, confined to wheelchair, who presents for Follow-up (Pt presents for 6 month check up, care taker states cough x 1 wk, no refills needed.).  HPI Historian(s): Self and Caretaker    c/o cough.  Onset of symptoms was 1 week ago.  Symptoms have been gradually improving since that time.  Severity is moderate  Tried nothing  Improved or relieved by nothing  Exacerbated by eating  Also c/o mildly elevated temperature.  Denies NVD, abd/flank pain    Review of Systems   Respiratory:  Positive for cough.    All other systems reviewed and are negative.    No LMP recorded. Patient is postmenopausal.    Patient Care Team:  Alton Potter DO as PCP - General (Family Medicine)  Alton Potter DO as PCP [facility]  Kenneth Morales MD (Neurology)  Hakeem Vázquez MD as Primary Care Provider  Negrito Yates MD as Surgeon (Surgical Oncology)  Tuan Reyes MD as Consulting Physician (Endocrinology)  Hakeem Vázquez MD as Referring Physician (Rheumatology)  Demetrio Zimmer DPM as Referring Physician (Podiatry)  Yo Daniels MD as Consulting Physician (Cardiology)  Dereje Hernandez OD (Optometry)  Stef Plaza MD as Consulting Physician (Neurology)    Current Outpatient Medications   Medication Instructions    aspirin 81 mg, Daily    azithromycin (Zithromax) 250 mg tablet Take 2 tablets (500 mg) by mouth once daily for 1 day, THEN 1 tablet (250 mg) once daily for 4 days. Take 2 tabs (500 mg) by mouth today, than 1 daily for 4 days..    carBAMazepine (TEGretol) 200 mg tablet TAKE 2 TABS (400MG) BY MOUTH EVERY MORNING;TAKE 1 TAB BY MOUTH THREE TIMES DAILY    cholecalciferol (VITAMIN D-3) 4,000 Units, oral, Daily    docusate sodium (COLACE) 100 mg, oral, 2 times daily    folic acid (FOLVITE) 1 mg, oral, Daily    hydroCHLOROthiazide (MICROZIDE) 12.5 mg, oral, Every morning    magnesium hydroxide (Milk of  Magnesia) 2,400 mg/10 mL suspension suspension 10 mL, Daily PRN    methylPREDNISolone (Medrol Dospak) 4 mg tablets Take as directed on package.    phenytoin ER (DILANTIN) 100 mg, oral, 2 times daily    polyethylene glycol (GLYCOLAX, MIRALAX) 17 g, oral, Daily    Prolia 60 mg, Every 6 months    traZODone (DESYREL) 100 mg, oral, Nightly       Objective   /89   Pulse 84   Temp 37.4 °C (99.3 °F)   SpO2 99%           Physical Exam  Vitals and nursing note reviewed.   Constitutional:       General: She is not in acute distress.     Appearance: Normal appearance. She is not diaphoretic.      Comments: Wheelchair   HENT:      Head: Normocephalic and atraumatic.      Right Ear: Tympanic membrane, ear canal and external ear normal.      Left Ear: Tympanic membrane, ear canal and external ear normal.      Nose: Nose normal.      Mouth/Throat:      Mouth: Mucous membranes are moist.      Pharynx: Oropharynx is clear. No posterior oropharyngeal erythema.   Eyes:      General: No scleral icterus.     Extraocular Movements: Extraocular movements intact.      Conjunctiva/sclera: Conjunctivae normal.   Cardiovascular:      Rate and Rhythm: Normal rate and regular rhythm.      Heart sounds: Normal heart sounds.   Pulmonary:      Effort: Pulmonary effort is normal. No respiratory distress.      Breath sounds: Wheezing present. No rhonchi or rales.   Musculoskeletal:      Cervical back: Normal range of motion and neck supple. No tenderness.      Right lower leg: No edema.      Left lower leg: No edema.   Lymphadenopathy:      Cervical: No cervical adenopathy.   Skin:     General: Skin is warm and dry.      Coloration: Skin is not jaundiced.   Neurological:      General: No focal deficit present.      Mental Status: She is alert and oriented to person, place, and time. Mental status is at baseline.   Psychiatric:         Mood and Affect: Mood normal.         Behavior: Behavior normal.         Thought Content: Thought content  normal.         Assessment & Plan  Insomnia, unspecified type  Well controlled.        Chronic constipation  Well controlled.   Orders:    Follow Up In Primary Care - Medicare Annual; Future    Cerebral palsy, unspecified type (Multi)  Per neurology.       Seizure (Multi)  Per neurology.       Bronchitis  Suspect resolving viral URI. May consider antibiotic (Z-shandra, likely) if symptoms persist or worsen, or any fever after today.  Orders:    azithromycin (Zithromax) 250 mg tablet; Take 2 tablets (500 mg) by mouth once daily for 1 day, THEN 1 tablet (250 mg) once daily for 4 days. Take 2 tabs (500 mg) by mouth today, than 1 daily for 4 days..    methylPREDNISolone (Medrol Dospak) 4 mg tablets; Take as directed on package.

## 2025-03-04 NOTE — PATIENT INSTRUCTIONS
Please return for a(n) blood pressure, sleep, and medication follow-up appointment and Wellness visit in August 2025, after tests to review results and options, earlier if any question or concern. Please schedule additional problem-focused appointment(s) to address additional problem(s).    Recommended vaccines:  Influenza, annual  Shingrix (shingles) vaccine series  Avoid taking Biotin (a vitamin, shows up particularly in hair/nail supplements) for a week prior to any blood tests, as it can interfere with certain results. Fasting for labs means 12 hours, nothing to eat or drink, except water and medications, unless directed otherwise.    For assistance with scheduling referrals or consultations, please call [phone]. For laboratory, radiology, and other tests, please call [phone] ([phone] for pediatrics). Please review prescription inserts and published information for possible adverse effects of all medications. Return after testing or consultation to review results and recommendations, if symptoms persist, change, worsen, or return, or if you have any question or concern. If you do not get results within 7-10 days, or you have any question or concern, please send a message, call the office ([phone]), or return to the office for a follow-up appointment. For non-emergencies, you may call the office. For emergencies, call 9-1-1 or go to the nearest Emergency Department. Please schedule additional appointment(s) to address concern(s) not addressed today. An annual Wellness visit is strongly recommended. A Wellness visit should be dedicated to addressing routine health maintenance matters (e.g., cancer screenings, cardiovascular screening, etc.). Problem-focused visits, typically prompted by symptoms or specific concerns, are usually conducted separately, particularly if multiple or complex problems need to be addressed.    In general, results are not released or discussed over the telephone, but  at an appointment or via  [employer]. Results of tests done through [facility] are released via  [employer] (see below).  [email or web address]   [employer] support line: [phone]

## 2025-05-06 ENCOUNTER — APPOINTMENT (OUTPATIENT)
Dept: ENDOCRINOLOGY | Facility: CLINIC | Age: 75
End: 2025-05-06
Payer: MEDICARE

## 2025-05-06 VITALS — SYSTOLIC BLOOD PRESSURE: 159 MMHG | DIASTOLIC BLOOD PRESSURE: 95 MMHG | HEART RATE: 73 BPM

## 2025-05-06 DIAGNOSIS — M81.0 OSTEOPOROSIS WITHOUT CURRENT PATHOLOGICAL FRACTURE, UNSPECIFIED OSTEOPOROSIS TYPE: Primary | ICD-10-CM

## 2025-05-06 DIAGNOSIS — E21.3 HYPERPARATHYROIDISM (MULTI): ICD-10-CM

## 2025-05-06 PROCEDURE — 1159F MED LIST DOCD IN RCRD: CPT | Performed by: INTERNAL MEDICINE

## 2025-05-06 PROCEDURE — G2211 COMPLEX E/M VISIT ADD ON: HCPCS | Performed by: INTERNAL MEDICINE

## 2025-05-06 PROCEDURE — 99213 OFFICE O/P EST LOW 20 MIN: CPT | Performed by: INTERNAL MEDICINE

## 2025-05-06 PROCEDURE — 3077F SYST BP >= 140 MM HG: CPT | Performed by: INTERNAL MEDICINE

## 2025-05-06 PROCEDURE — 3080F DIAST BP >= 90 MM HG: CPT | Performed by: INTERNAL MEDICINE

## 2025-05-06 PROCEDURE — 1160F RVW MEDS BY RX/DR IN RCRD: CPT | Performed by: INTERNAL MEDICINE

## 2025-05-06 PROCEDURE — 1036F TOBACCO NON-USER: CPT | Performed by: INTERNAL MEDICINE

## 2025-05-06 NOTE — PROGRESS NOTES
History Of Present Illness  Amanda Pang is a 75 y.o. female with with osteoporosis  Hyperparathyroidism     Prolia 60 mg q6 months     Patient not a candidate for parathyroidectomy     No interval history of fracture    Past Medical History  She has a past medical history of Diaphragmatic hernia without obstruction or gangrene (10/22/2021), Epilepsy, unspecified, not intractable, without status epilepticus (02/26/2019), Impacted cerumen, bilateral (09/18/2019), Laceration of foot (08/23/2024), Personal history of pneumonia (recurrent) (04/20/2021), Syncope and collapse (09/18/2019), and Urinary tract infection, site not specified (09/18/2019).    Surgical History  She has a past surgical history that includes Other surgical history (09/18/2019).     Social History  She reports that she has never smoked. She has never used smokeless tobacco. She reports that she does not drink alcohol and does not use drugs.    Family History  Family History[1]    Medications  Current Outpatient Medications   Medication Instructions    aspirin 81 mg, Daily    carBAMazepine (TEGretol) 200 mg tablet TAKE 2 TABS (400MG) BY MOUTH EVERY MORNING;TAKE 1 TAB BY MOUTH THREE TIMES DAILY    cholecalciferol (VITAMIN D-3) 4,000 Units, oral, Daily    docusate sodium (COLACE) 100 mg, oral, 2 times daily    folic acid (FOLVITE) 1 mg, oral, Daily    hydroCHLOROthiazide (MICROZIDE) 12.5 mg, oral, Every morning    magnesium hydroxide (Milk of Magnesia) 2,400 mg/10 mL suspension suspension 10 mL, Daily PRN    phenytoin ER (DILANTIN) 100 mg, oral, 2 times daily    polyethylene glycol (GLYCOLAX, MIRALAX) 17 g, oral, Daily    Prolia 60 mg, Every 6 months    traZODone (DESYREL) 100 mg, oral, Nightly       Allergies  Patient has no known allergies.      Last Recorded Vitals  Blood pressure (!) 159/95, pulse 73.    Physical Exam  Constitutional:       General: She is not in acute distress.     Comments: Examined in wheelchair   Cardiovascular:       Comments: Ankle edema             IMPRESSION  OSTEOPOROSIS  PRIMARY HYPERPARATHYROIDISM  Chronic Prolia every 6 months      RECOMMENDATIONS  Draw CMP, PTH, vitamin D with [facility] labs.  Prolia as ordered  Follow up 6 months         [1]   Family History  Problem Relation Name Age of Onset    Breast cancer Mother

## 2025-05-06 NOTE — PATIENT INSTRUCTIONS
RECOMMENDATIONS  Draw CMP, PTH, vitamin D with [facility] labs.  Prolia as ordered  Follow up 6 months

## 2025-05-14 ENCOUNTER — INFUSION (OUTPATIENT)
Dept: HEMATOLOGY/ONCOLOGY | Facility: CLINIC | Age: 75
End: 2025-05-14
Payer: MEDICARE

## 2025-05-14 VITALS
HEART RATE: 83 BPM | BODY MASS INDEX: 19.81 KG/M2 | TEMPERATURE: 97.7 F | SYSTOLIC BLOOD PRESSURE: 136 MMHG | OXYGEN SATURATION: 98 % | RESPIRATION RATE: 17 BRPM | DIASTOLIC BLOOD PRESSURE: 76 MMHG | WEIGHT: 95 LBS

## 2025-05-14 DIAGNOSIS — M81.0 OSTEOPOROSIS WITHOUT CURRENT PATHOLOGICAL FRACTURE, UNSPECIFIED OSTEOPOROSIS TYPE: ICD-10-CM

## 2025-05-14 LAB
25(OH)D3+25(OH)D2 SERPL-MCNC: 62 NG/ML (ref 30–100)
ALBUMIN SERPL-MCNC: 4.2 G/DL (ref 3.6–5.1)
ALP SERPL-CCNC: 54 U/L (ref 37–153)
ALT SERPL-CCNC: 12 U/L (ref 6–29)
ANION GAP SERPL CALCULATED.4IONS-SCNC: 16 MMOL/L (CALC) (ref 7–17)
AST SERPL-CCNC: 26 U/L (ref 10–35)
BILIRUB SERPL-MCNC: 0.3 MG/DL (ref 0.2–1.2)
BUN SERPL-MCNC: 18 MG/DL (ref 7–25)
CALCIUM SERPL-MCNC: 11.2 MG/DL (ref 8.6–10.4)
CHLORIDE SERPL-SCNC: 107 MMOL/L (ref 98–110)
CO2 SERPL-SCNC: 20 MMOL/L (ref 20–32)
CREAT SERPL-MCNC: 0.68 MG/DL (ref 0.6–1)
EGFRCR SERPLBLD CKD-EPI 2021: 92 ML/MIN/1.73M2
GLUCOSE SERPL-MCNC: 89 MG/DL (ref 65–99)
POTASSIUM SERPL-SCNC: 4.7 MMOL/L (ref 3.5–5.3)
PROT SERPL-MCNC: 7.6 G/DL (ref 6.1–8.1)
PTH-INTACT SERPL-MCNC: 92 PG/ML (ref 16–77)
SODIUM SERPL-SCNC: 143 MMOL/L (ref 135–146)

## 2025-05-14 PROCEDURE — 96372 THER/PROPH/DIAG INJ SC/IM: CPT

## 2025-05-14 PROCEDURE — 2500000004 HC RX 250 GENERAL PHARMACY W/ HCPCS (ALT 636 FOR OP/ED): Mod: JZ,TB | Performed by: INTERNAL MEDICINE

## 2025-05-14 RX ORDER — EPINEPHRINE 0.3 MG/.3ML
0.3 INJECTION SUBCUTANEOUS EVERY 5 MIN PRN
Status: DISCONTINUED | OUTPATIENT
Start: 2025-05-14 | End: 2025-05-14 | Stop reason: HOSPADM

## 2025-05-14 RX ORDER — DIPHENHYDRAMINE HYDROCHLORIDE 50 MG/ML
50 INJECTION, SOLUTION INTRAMUSCULAR; INTRAVENOUS AS NEEDED
OUTPATIENT
Start: 2025-10-31

## 2025-05-14 RX ORDER — EPINEPHRINE 0.3 MG/.3ML
0.3 INJECTION SUBCUTANEOUS EVERY 5 MIN PRN
OUTPATIENT
Start: 2025-10-31

## 2025-05-14 RX ORDER — DIPHENHYDRAMINE HYDROCHLORIDE 50 MG/ML
50 INJECTION, SOLUTION INTRAMUSCULAR; INTRAVENOUS AS NEEDED
Status: DISCONTINUED | OUTPATIENT
Start: 2025-05-14 | End: 2025-05-14 | Stop reason: HOSPADM

## 2025-05-14 RX ORDER — FAMOTIDINE 10 MG/ML
20 INJECTION, SOLUTION INTRAVENOUS ONCE AS NEEDED
Status: DISCONTINUED | OUTPATIENT
Start: 2025-05-14 | End: 2025-05-14 | Stop reason: HOSPADM

## 2025-05-14 RX ORDER — ALBUTEROL SULFATE 0.83 MG/ML
3 SOLUTION RESPIRATORY (INHALATION) AS NEEDED
OUTPATIENT
Start: 2025-10-31

## 2025-05-14 RX ORDER — ALBUTEROL SULFATE 0.83 MG/ML
3 SOLUTION RESPIRATORY (INHALATION) AS NEEDED
Status: DISCONTINUED | OUTPATIENT
Start: 2025-05-14 | End: 2025-05-14 | Stop reason: HOSPADM

## 2025-05-14 RX ORDER — FAMOTIDINE 10 MG/ML
20 INJECTION, SOLUTION INTRAVENOUS ONCE AS NEEDED
OUTPATIENT
Start: 2025-10-31

## 2025-05-14 RX ADMIN — DENOSUMAB 60 MG: 60 INJECTION SUBCUTANEOUS at 12:04

## 2025-05-14 ASSESSMENT — PAIN SCALES - GENERAL: PAINLEVEL_OUTOF10: 0-NO PAIN

## 2025-05-14 NOTE — PROGRESS NOTES
Per patient care giver, patient had not any dental work done in the last 4 weeks. Patient's caregiver aware of not to have any dental work done in the next 4 weeks. Patient taking Vitamin D a as prescribed. Patient verbalized understanding of information.

## 2025-06-18 DIAGNOSIS — R56.9 SEIZURE (MULTI): ICD-10-CM

## 2025-06-18 DIAGNOSIS — G40.909 SEIZURE DISORDER (MULTI): ICD-10-CM

## 2025-06-21 RX ORDER — PHENYTOIN SODIUM 100 MG/1
100 CAPSULE, EXTENDED RELEASE ORAL 2 TIMES DAILY
Qty: 60 CAPSULE | Refills: 10 | Status: SHIPPED | OUTPATIENT
Start: 2025-06-21

## 2025-06-21 RX ORDER — CARBAMAZEPINE 200 MG/1
TABLET ORAL
Qty: 150 TABLET | Refills: 10 | Status: SHIPPED | OUTPATIENT
Start: 2025-06-21

## 2025-07-14 DIAGNOSIS — M81.0 OSTEOPOROSIS, UNSPECIFIED OSTEOPOROSIS TYPE, UNSPECIFIED PATHOLOGICAL FRACTURE PRESENCE: ICD-10-CM

## 2025-07-15 RX ORDER — CHOLECALCIFEROL (VITAMIN D3) 50 MCG
TABLET ORAL
Qty: 60 TABLET | Refills: 0 | Status: SHIPPED | OUTPATIENT
Start: 2025-07-15

## 2025-07-20 NOTE — PROGRESS NOTES
"Date of Service: 7/22/2025  Patient: Amanda Pang  MRN: [id number]  Referring Provider: No ref. provider found  PCP: Alton Potter DO    History of Present Illness:   Ms. Pang is a 75 y.o. female who presents to neurology clinic for evaluation of seizures. Amanda Pang's past medical history is pertinent for cerebral palsy and intellectual disability.    She currently lives in supportive living. She has a history of seizures but staff from the living facility are unsure of the semiology of seizures. She has not had any seizure-like activity in at least 7 years since she has been in the living facility.  She has been maintained on phenytoin and carbamazepine without any issues.    Current medications include:  Phenytoin  mg takes 1-1  Carbamazepine 200 mg takes 2-1-1-1    Review of Systems:  The systems were reviewed with pertinent positives and negatives documented in the HPI.      Past Medical & Surgical History  Medical History[1]  Surgical History[2]    Social History:   Social History     Tobacco Use    Smoking status: Never    Smokeless tobacco: Never   Substance Use Topics    Alcohol use: Never     Family History:   Family history is unknown     Medications:   Current Medications[3]     General Physical Exam:  /81 (BP Location: Left arm, Patient Position: Sitting, BP Cuff Size: Adult)   Pulse 73   Temp 35.8 °C (96.5 °F) (Temporal)   Resp 16   Ht (!) 1.476 m (4' 10.1\")   Wt (!) 43.1 kg (95 lb)   BMI 19.79 kg/m²      She looks well and is not in any acute distress. Breathing comfortably on room air.     Neurological Exam:   Encephalopathic, stable and at baseline.  Minimal verbalization    Dysarthric     Cranial nerves:  CN 2   Tracks speaker  CN 3, 4, 6   Left eye exotropia  CN 5   Unable to assess due to cooperation  CN 7   Nasolabial folds symmetric.   CN 8   Hearing intact to conversation.   CN 9   Able to assess due to cooperation  CN 11   CN 12   Tongue midline   "   Motor:    Arms are contracted bilaterally. Able to lift antigravity  Minimal movement of lower extremities     Sensory:   Light Touch: Reports sensation in all extremities    Gait:  In wheelchair    Results:    Lab Results   Component Value Date    TSH 1.64 02/16/2022        Lab Results   Component Value Date    HAKAN NEGATIVE 09/07/2022       Lab Results   Component Value Date    CKTOTAL 30 09/10/2019       Lab Results   Component Value Date    SPEP NORMAL 03/07/2023     CBC:   Lab Results   Component Value Date    WBC 3.7 (L) 08/29/2023    HGB 11.9 (L) 08/29/2023    HCT 39.4 08/29/2023     08/29/2023     BMP:   Lab Results   Component Value Date     05/13/2025    K 4.7 05/13/2025     05/13/2025    CO2 20 05/13/2025    BUN 18 05/13/2025    CREATININE 0.68 05/13/2025    CALCIUM 11.2 (H) 05/13/2025     LFT:   Lab Results   Component Value Date    ALKPHOS 54 05/13/2025    BILITOT 0.3 05/13/2025    BILIDIR 0.0 08/29/2023    PROT 7.6 05/13/2025    ALBUMIN 4.2 05/13/2025    ALT 12 05/13/2025    AST 26 05/13/2025       Imaging:  No imaging to review    Impression:  Amanda Pang is a 75 y.o. who presents with history of seizures. Semiology is unclear.  She has been maintained on phenytoin and carbamazepine with no seizures in the past 7 years while she has been living in the supportive living facility.    Plan:  Phenytoin  mg BID  Carbamazepine 200 mg takes 2-1-1-1  Phenytoin level, carbamazepine level ordered    She will follow-up in 1 year     Reviewed and approved by MANUELA LEES on 7/22/25 at 2:27 PM.    Medical decision making was moderate complexity. The patient was seen and evaluated for 1 chronic illness. I personally reviewed multiple external notes and multiple test results from multiple sources in the EHR. Two lab tests were ordered for further assessment.  Ongoing prescription drug management.            [1]   Past Medical History:  Diagnosis Date    Diaphragmatic hernia without  obstruction or gangrene 10/22/2021    Paraesophageal hiatal hernia    Epilepsy, unspecified, not intractable, without status epilepticus 02/26/2019    Epilepsy    Impacted cerumen, bilateral 09/18/2019    Bilateral impacted cerumen    Laceration of foot 08/23/2024    Personal history of pneumonia (recurrent) 04/20/2021    History of pneumonia    Syncope and collapse 09/18/2019    Syncope and collapse    Urinary tract infection, site not specified 09/18/2019    Acute UTI (urinary tract infection)   [2]   Past Surgical History:  Procedure Laterality Date    OTHER SURGICAL HISTORY  09/18/2019    Achilles tenotomy   [3]   Current Outpatient Medications:     aspirin 81 mg EC tablet, Take 1 tablet (81 mg) by mouth once daily., Disp: , Rfl:     carBAMazepine (TEGretol) 200 mg tablet, TAKE 2 TABS (400MG) BY MOUTH EVERY MORNING;TAKE 1 TAB BY MOUTH THREE TIMES DAILY, Disp: 150 tablet, Rfl: 10    cholecalciferol (Vitamin D-3) 50 mcg (2,000 units) tablet, TAKE 2 TABS (4,000 UNITS) BY MOUTH ONCE DAILY, Disp: 60 tablet, Rfl: 0    denosumab (Prolia) 60 mg/mL syringe, Inject 1 mL (60 mg) under the skin every 6 months., Disp: , Rfl:     docusate sodium (Colace) 100 mg capsule, Take 1 capsule (100 mg) by mouth 2 times a day., Disp: 200 capsule, Rfl: 3    folic acid (Folvite) 1 mg tablet, Take 1 tablet (1 mg) by mouth once daily., Disp: 100 tablet, Rfl: 3    hydroCHLOROthiazide (Microzide) 12.5 mg capsule, Take 1 capsule (12.5 mg) by mouth once daily in the morning., Disp: 100 capsule, Rfl: 3    magnesium hydroxide (Milk of Magnesia) 2,400 mg/10 mL suspension suspension, Take 10 mL by mouth once daily as needed for constipation., Disp: , Rfl:     phenytoin ER (Dilantin) 100 mg capsule, TAKE 1 CAP BY MOUTH TWICE DAILY, Disp: 60 capsule, Rfl: 10    polyethylene glycol (Glycolax, Miralax) 17 gram/dose powder, Take 17 g by mouth once daily., Disp: 1530 g, Rfl: 3    traZODone (Desyrel) 100 mg tablet, Take 1 tablet (100 mg) by mouth once  daily at bedtime., Disp: 100 tablet, Rfl: 3

## 2025-07-22 ENCOUNTER — APPOINTMENT (OUTPATIENT)
Dept: NEUROLOGY | Facility: CLINIC | Age: 75
End: 2025-07-22
Payer: MEDICARE

## 2025-07-22 VITALS
TEMPERATURE: 96.5 F | WEIGHT: 95 LBS | HEIGHT: 58 IN | RESPIRATION RATE: 16 BRPM | SYSTOLIC BLOOD PRESSURE: 158 MMHG | HEART RATE: 73 BPM | BODY MASS INDEX: 19.94 KG/M2 | DIASTOLIC BLOOD PRESSURE: 81 MMHG

## 2025-07-22 DIAGNOSIS — Z51.81 MEDICATION MONITORING ENCOUNTER: Primary | ICD-10-CM

## 2025-07-22 PROCEDURE — 1126F AMNT PAIN NOTED NONE PRSNT: CPT | Performed by: STUDENT IN AN ORGANIZED HEALTH CARE EDUCATION/TRAINING PROGRAM

## 2025-07-22 PROCEDURE — 1159F MED LIST DOCD IN RCRD: CPT | Performed by: STUDENT IN AN ORGANIZED HEALTH CARE EDUCATION/TRAINING PROGRAM

## 2025-07-22 PROCEDURE — 3077F SYST BP >= 140 MM HG: CPT | Performed by: STUDENT IN AN ORGANIZED HEALTH CARE EDUCATION/TRAINING PROGRAM

## 2025-07-22 PROCEDURE — 3079F DIAST BP 80-89 MM HG: CPT | Performed by: STUDENT IN AN ORGANIZED HEALTH CARE EDUCATION/TRAINING PROGRAM

## 2025-07-22 PROCEDURE — 1036F TOBACCO NON-USER: CPT | Performed by: STUDENT IN AN ORGANIZED HEALTH CARE EDUCATION/TRAINING PROGRAM

## 2025-07-22 PROCEDURE — 99214 OFFICE O/P EST MOD 30 MIN: CPT | Performed by: STUDENT IN AN ORGANIZED HEALTH CARE EDUCATION/TRAINING PROGRAM

## 2025-07-22 PROCEDURE — 3008F BODY MASS INDEX DOCD: CPT | Performed by: STUDENT IN AN ORGANIZED HEALTH CARE EDUCATION/TRAINING PROGRAM

## 2025-07-22 ASSESSMENT — PAIN SCALES - GENERAL: PAINLEVEL_OUTOF10: 0-NO PAIN

## 2025-07-23 DIAGNOSIS — Z12.31 ENCOUNTER FOR SCREENING MAMMOGRAM FOR BREAST CANCER: ICD-10-CM

## 2025-08-01 LAB
CARBAMAZEPINE SERPL-MCNC: 5.1 MG/L (ref 4–12)
PHENYTOIN SERPL-MCNC: 14.5 MG/L (ref 10–20)

## 2025-08-19 ENCOUNTER — APPOINTMENT (OUTPATIENT)
Dept: PRIMARY CARE | Facility: CLINIC | Age: 75
End: 2025-08-19
Payer: MEDICARE

## 2025-08-21 ENCOUNTER — TELEPHONE (OUTPATIENT)
Dept: PRIMARY CARE | Facility: CLINIC | Age: 75
End: 2025-08-21
Payer: MEDICARE

## 2025-08-21 DIAGNOSIS — G47.00 INSOMNIA, UNSPECIFIED TYPE: ICD-10-CM

## 2025-08-21 DIAGNOSIS — Z00.00 HEALTHCARE MAINTENANCE: ICD-10-CM

## 2025-08-21 DIAGNOSIS — K59.09 CHRONIC CONSTIPATION: ICD-10-CM

## 2025-08-21 DIAGNOSIS — R60.0 PEDAL EDEMA: ICD-10-CM

## 2025-08-21 DIAGNOSIS — M81.0 OSTEOPOROSIS, UNSPECIFIED OSTEOPOROSIS TYPE, UNSPECIFIED PATHOLOGICAL FRACTURE PRESENCE: ICD-10-CM

## 2025-08-21 RX ORDER — TRAZODONE HYDROCHLORIDE 100 MG/1
100 TABLET ORAL NIGHTLY
Qty: 100 TABLET | Refills: 0 | Status: SHIPPED | OUTPATIENT
Start: 2025-08-21

## 2025-08-21 RX ORDER — CHOLECALCIFEROL (VITAMIN D3) 50 MCG
100 TABLET ORAL DAILY
Qty: 60 TABLET | Refills: 0 | Status: SHIPPED | OUTPATIENT
Start: 2025-08-21

## 2025-08-21 RX ORDER — FOLIC ACID 1 MG/1
1 TABLET ORAL DAILY
Qty: 100 TABLET | Refills: 0 | Status: SHIPPED | OUTPATIENT
Start: 2025-08-21

## 2025-08-21 RX ORDER — POLYETHYLENE GLYCOL 3350 17 G/17G
17 POWDER, FOR SOLUTION ORAL DAILY
Qty: 1530 G | Refills: 0 | Status: SHIPPED | OUTPATIENT
Start: 2025-08-21

## 2025-08-21 RX ORDER — HYDROCHLOROTHIAZIDE 12.5 MG/1
12.5 CAPSULE ORAL EVERY MORNING
Qty: 100 CAPSULE | Refills: 0 | Status: SHIPPED | OUTPATIENT
Start: 2025-08-21

## 2025-08-21 RX ORDER — DOCUSATE SODIUM 100 MG/1
100 CAPSULE, LIQUID FILLED ORAL 2 TIMES DAILY
Qty: 200 CAPSULE | Refills: 0 | Status: SHIPPED | OUTPATIENT
Start: 2025-08-21

## 2025-09-09 ENCOUNTER — APPOINTMENT (OUTPATIENT)
Dept: PRIMARY CARE | Facility: CLINIC | Age: 75
End: 2025-09-09
Payer: MEDICARE

## 2025-11-11 ENCOUNTER — APPOINTMENT (OUTPATIENT)
Dept: ENDOCRINOLOGY | Facility: CLINIC | Age: 75
End: 2025-11-11
Payer: MEDICARE

## 2026-07-21 ENCOUNTER — APPOINTMENT (OUTPATIENT)
Dept: NEUROLOGY | Facility: CLINIC | Age: 76
End: 2026-07-21
Payer: MEDICARE